# Patient Record
Sex: FEMALE | Race: WHITE | Employment: OTHER | URBAN - METROPOLITAN AREA
[De-identification: names, ages, dates, MRNs, and addresses within clinical notes are randomized per-mention and may not be internally consistent; named-entity substitution may affect disease eponyms.]

---

## 2017-01-14 ENCOUNTER — HOSPITAL ENCOUNTER (EMERGENCY)
Age: 32
Discharge: HOME | End: 2017-01-14
Attending: EMERGENCY MEDICINE
Payer: MEDICAID

## 2017-01-14 VITALS
OXYGEN SATURATION: 97 % | BODY MASS INDEX: 25.71 KG/M2 | SYSTOLIC BLOOD PRESSURE: 115 MMHG | DIASTOLIC BLOOD PRESSURE: 85 MMHG | TEMPERATURE: 98.7 F | HEIGHT: 66 IN | HEART RATE: 93 BPM | RESPIRATION RATE: 22 BRPM | WEIGHT: 160 LBS
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
## 2017-01-14 PROCEDURE — 75810000275 HC EMERGENCY DEPT VISIT NO LEVEL OF CARE

## 2017-01-14 PROCEDURE — 99284 EMERGENCY DEPT VISIT MOD MDM: CPT

## 2017-01-14 RX ORDER — AZITHROMYCIN 250 MG/1
1000 TABLET, FILM COATED ORAL ONCE
Status: DISCONTINUED | OUTPATIENT
Start: 2017-01-14 | End: 2017-01-14

## 2017-01-14 NOTE — ED NOTES
Pt refusing forensics exam, and has been given education and resources by Angelica Thompson and Discharge instructions given to patient by MD and nurse. Pt has been given counseling on medication use and verbalizes understanding. . Pt ambulated off of unit in no signs of distress.

## 2017-01-14 NOTE — DISCHARGE INSTRUCTIONS
We hope that we have addressed all of your medical concerns. The examination and treatment you received in the Emergency Department were for an emergent problem and were not intended as complete care. It is important that you follow up with your healthcare provider(s) for ongoing care. If your symptoms worsen or do not improve as expected, and you are unable to reach your usual health care provider(s), you should return to the Emergency Department. Today's healthcare is undergoing tremendous change, and patient satisfaction surveys are one of the many tools to assess the quality of medical care. You may receive a survey from the [employer] regarding your experience in the Emergency Department. I hope that your experience has been completely positive, particularly the medical care that I provided. As such, please participate in the survey; anything less than excellent does not meet my expectations or intentions. [facility] and [address] [place] [address] participate in nationally recognized quality of care measures. If your blood pressure is greater than 120/80, as reported below, we urge that you seek medical care to address the potential of high blood pressure, commonly known as hypertension. Hypertension can be hereditary or can be caused by certain medical conditions, pain, stress, or \"white coat syndrome. \"       Please make an appointment with your health care provider(s) for follow up of your Emergency Department visit. VITALS:   Patient Vitals for the past 8 hrs:   Temp Pulse Resp BP SpO2   01/14/17 1442 98.7 °F (37.1 °C) 93 22 115/85 97 %          Thank you for allowing us to provide you with medical care today. We realize that you have many choices for your emergency care needs. Please choose us in the future for any continued health care needs. Nuris Mccord M.D.  [facility]: [phone]            No results found for this or any previous visit (from the past 24 hour(s)). No results found.
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
## 2017-06-08 ENCOUNTER — HOSPITAL ENCOUNTER (INPATIENT)
Age: 32
LOS: 2 days | Discharge: HOME | DRG: 282 | End: 2017-06-10
Attending: EMERGENCY MEDICINE | Admitting: INTERNAL MEDICINE
Payer: MEDICAID

## 2017-06-08 ENCOUNTER — APPOINTMENT (OUTPATIENT)
Dept: CT IMAGING | Age: 32
DRG: 282 | End: 2017-06-08
Attending: PHYSICIAN ASSISTANT
Payer: MEDICAID

## 2017-06-08 ENCOUNTER — APPOINTMENT (OUTPATIENT)
Dept: ULTRASOUND IMAGING | Age: 32
DRG: 282 | End: 2017-06-08
Attending: INTERNAL MEDICINE
Payer: MEDICAID

## 2017-06-08 DIAGNOSIS — N30.00 ACUTE CYSTITIS WITHOUT HEMATURIA: ICD-10-CM

## 2017-06-08 DIAGNOSIS — K85.90 ACUTE PANCREATITIS WITHOUT INFECTION OR NECROSIS, UNSPECIFIED PANCREATITIS TYPE: Primary | ICD-10-CM

## 2017-06-08 PROBLEM — F10.10 ALCOHOL ABUSE: Status: ACTIVE | Noted: 2017-06-08

## 2017-06-08 PROBLEM — Z72.0 TOBACCO ABUSE: Status: ACTIVE | Noted: 2017-06-08

## 2017-06-08 LAB
ALBUMIN SERPL BCP-MCNC: 3.7 G/DL (ref 3.5–5)
ALBUMIN/GLOB SERPL: 0.8 {RATIO} (ref 1.1–2.2)
ALP SERPL-CCNC: 65 U/L (ref 45–117)
ALT SERPL-CCNC: 28 U/L (ref 12–78)
ANION GAP BLD CALC-SCNC: 9 MMOL/L (ref 5–15)
APPEARANCE UR: ABNORMAL
AST SERPL W P-5'-P-CCNC: 27 U/L (ref 15–37)
BACTERIA URNS QL MICRO: ABNORMAL /HPF
BASOPHILS # BLD AUTO: 0 K/UL (ref 0–0.1)
BASOPHILS # BLD: 0 % (ref 0–1)
BILIRUB SERPL-MCNC: 0.2 MG/DL (ref 0.2–1)
BILIRUB UR QL: NEGATIVE
BUN SERPL-MCNC: 10 MG/DL (ref 6–20)
BUN/CREAT SERPL: 13 (ref 12–20)
CALCIUM SERPL-MCNC: 9.2 MG/DL (ref 8.5–10.1)
CHLORIDE SERPL-SCNC: 102 MMOL/L (ref 97–108)
CO2 SERPL-SCNC: 27 MMOL/L (ref 21–32)
COLOR UR: ABNORMAL
CREAT SERPL-MCNC: 0.76 MG/DL (ref 0.55–1.02)
EOSINOPHIL # BLD: 0.1 K/UL (ref 0–0.4)
EOSINOPHIL NFR BLD: 1 % (ref 0–7)
EPITH CASTS URNS QL MICRO: ABNORMAL /LPF
ERYTHROCYTE [DISTWIDTH] IN BLOOD BY AUTOMATED COUNT: 14.2 % (ref 11.5–14.5)
GLOBULIN SER CALC-MCNC: 4.7 G/DL (ref 2–4)
GLUCOSE SERPL-MCNC: 72 MG/DL (ref 65–100)
GLUCOSE UR STRIP.AUTO-MCNC: 100 MG/DL
HCG UR QL: NEGATIVE
HCT VFR BLD AUTO: 42.1 % (ref 35–47)
HGB BLD-MCNC: 13.8 G/DL (ref 11.5–16)
HGB UR QL STRIP: NEGATIVE
KETONES UR QL STRIP.AUTO: NEGATIVE MG/DL
LEUKOCYTE ESTERASE UR QL STRIP.AUTO: ABNORMAL
LIPASE SERPL-CCNC: 1067 U/L (ref 73–393)
LYMPHOCYTES # BLD AUTO: 15 % (ref 12–49)
LYMPHOCYTES # BLD: 1.5 K/UL (ref 0.8–3.5)
MCH RBC QN AUTO: 32.4 PG (ref 26–34)
MCHC RBC AUTO-ENTMCNC: 32.8 G/DL (ref 30–36.5)
MCV RBC AUTO: 98.8 FL (ref 80–99)
MONOCYTES # BLD: 1.2 K/UL (ref 0–1)
MONOCYTES NFR BLD AUTO: 12 % (ref 5–13)
NEUTS SEG # BLD: 6.8 K/UL (ref 1.8–8)
NEUTS SEG NFR BLD AUTO: 72 % (ref 32–75)
NITRITE UR QL STRIP.AUTO: NEGATIVE
PH UR STRIP: 6 [PH] (ref 5–8)
PLATELET # BLD AUTO: 180 K/UL (ref 150–400)
POTASSIUM SERPL-SCNC: 4.4 MMOL/L (ref 3.5–5.1)
PROT SERPL-MCNC: 8.4 G/DL (ref 6.4–8.2)
PROT UR STRIP-MCNC: NEGATIVE MG/DL
RBC # BLD AUTO: 4.26 M/UL (ref 3.8–5.2)
RBC #/AREA URNS HPF: ABNORMAL /HPF (ref 0–5)
SODIUM SERPL-SCNC: 138 MMOL/L (ref 136–145)
SP GR UR REFRACTOMETRY: 1.01 (ref 1–1.03)
UROBILINOGEN UR QL STRIP.AUTO: 0.2 EU/DL (ref 0.2–1)
WBC # BLD AUTO: 9.5 K/UL (ref 3.6–11)
WBC URNS QL MICRO: ABNORMAL /HPF (ref 0–4)

## 2017-06-08 PROCEDURE — 81001 URINALYSIS AUTO W/SCOPE: CPT | Performed by: EMERGENCY MEDICINE

## 2017-06-08 PROCEDURE — 74176 CT ABD & PELVIS W/O CONTRAST: CPT

## 2017-06-08 PROCEDURE — 76705 ECHO EXAM OF ABDOMEN: CPT

## 2017-06-08 PROCEDURE — 96374 THER/PROPH/DIAG INJ IV PUSH: CPT

## 2017-06-08 PROCEDURE — 83690 ASSAY OF LIPASE: CPT | Performed by: PHYSICIAN ASSISTANT

## 2017-06-08 PROCEDURE — 74011250636 HC RX REV CODE- 250/636: Performed by: PHYSICIAN ASSISTANT

## 2017-06-08 PROCEDURE — 80053 COMPREHEN METABOLIC PANEL: CPT | Performed by: PHYSICIAN ASSISTANT

## 2017-06-08 PROCEDURE — 74011250637 HC RX REV CODE- 250/637: Performed by: INTERNAL MEDICINE

## 2017-06-08 PROCEDURE — 81025 URINE PREGNANCY TEST: CPT

## 2017-06-08 PROCEDURE — 74011000258 HC RX REV CODE- 258: Performed by: PHYSICIAN ASSISTANT

## 2017-06-08 PROCEDURE — 65270000029 HC RM PRIVATE

## 2017-06-08 PROCEDURE — 74011000250 HC RX REV CODE- 250: Performed by: INTERNAL MEDICINE

## 2017-06-08 PROCEDURE — 96361 HYDRATE IV INFUSION ADD-ON: CPT

## 2017-06-08 PROCEDURE — 36415 COLL VENOUS BLD VENIPUNCTURE: CPT | Performed by: PHYSICIAN ASSISTANT

## 2017-06-08 PROCEDURE — 99285 EMERGENCY DEPT VISIT HI MDM: CPT

## 2017-06-08 PROCEDURE — 85025 COMPLETE CBC W/AUTO DIFF WBC: CPT | Performed by: PHYSICIAN ASSISTANT

## 2017-06-08 PROCEDURE — 74011000250 HC RX REV CODE- 250: Performed by: PHYSICIAN ASSISTANT

## 2017-06-08 PROCEDURE — 96375 TX/PRO/DX INJ NEW DRUG ADDON: CPT

## 2017-06-08 PROCEDURE — 74011250636 HC RX REV CODE- 250/636: Performed by: INTERNAL MEDICINE

## 2017-06-08 RX ORDER — MORPHINE SULFATE 4 MG/ML
4 INJECTION, SOLUTION INTRAMUSCULAR; INTRAVENOUS
Status: COMPLETED | OUTPATIENT
Start: 2017-06-08 | End: 2017-06-08

## 2017-06-08 RX ORDER — IBUPROFEN 200 MG
1 TABLET ORAL DAILY
Status: DISCONTINUED | OUTPATIENT
Start: 2017-06-08 | End: 2017-06-10 | Stop reason: HOSPADM

## 2017-06-08 RX ORDER — MORPHINE SULFATE 2 MG/ML
2 INJECTION, SOLUTION INTRAMUSCULAR; INTRAVENOUS
Status: DISCONTINUED | OUTPATIENT
Start: 2017-06-08 | End: 2017-06-08

## 2017-06-08 RX ORDER — HYDROMORPHONE HYDROCHLORIDE 1 MG/ML
0.5 INJECTION, SOLUTION INTRAMUSCULAR; INTRAVENOUS; SUBCUTANEOUS
Status: DISCONTINUED | OUTPATIENT
Start: 2017-06-08 | End: 2017-06-08

## 2017-06-08 RX ORDER — ONDANSETRON 2 MG/ML
4 INJECTION INTRAMUSCULAR; INTRAVENOUS
Status: COMPLETED | OUTPATIENT
Start: 2017-06-08 | End: 2017-06-08

## 2017-06-08 RX ORDER — DIPHENHYDRAMINE HYDROCHLORIDE 50 MG/ML
25 INJECTION, SOLUTION INTRAMUSCULAR; INTRAVENOUS
Status: DISCONTINUED | OUTPATIENT
Start: 2017-06-08 | End: 2017-06-10 | Stop reason: HOSPADM

## 2017-06-08 RX ORDER — QUETIAPINE FUMARATE 25 MG/1
50 TABLET, FILM COATED ORAL 3 TIMES DAILY
Status: DISCONTINUED | OUTPATIENT
Start: 2017-06-08 | End: 2017-06-10 | Stop reason: HOSPADM

## 2017-06-08 RX ORDER — HYDROMORPHONE HYDROCHLORIDE 1 MG/ML
0.5 INJECTION, SOLUTION INTRAMUSCULAR; INTRAVENOUS; SUBCUTANEOUS
Status: DISCONTINUED | OUTPATIENT
Start: 2017-06-08 | End: 2017-06-09

## 2017-06-08 RX ORDER — SODIUM CHLORIDE 0.9 % (FLUSH) 0.9 %
5-10 SYRINGE (ML) INJECTION AS NEEDED
Status: DISCONTINUED | OUTPATIENT
Start: 2017-06-08 | End: 2017-06-10 | Stop reason: HOSPADM

## 2017-06-08 RX ORDER — QUETIAPINE FUMARATE 50 MG/1
50 TABLET, FILM COATED ORAL 3 TIMES DAILY
Status: ON HOLD | COMMUNITY
End: 2017-06-10

## 2017-06-08 RX ORDER — ENOXAPARIN SODIUM 100 MG/ML
40 INJECTION SUBCUTANEOUS EVERY 24 HOURS
Status: DISCONTINUED | OUTPATIENT
Start: 2017-06-08 | End: 2017-06-10 | Stop reason: HOSPADM

## 2017-06-08 RX ORDER — TRAZODONE HYDROCHLORIDE 150 MG/1
150 TABLET ORAL
Status: ON HOLD | COMMUNITY
End: 2017-08-04

## 2017-06-08 RX ORDER — BISMUTH SUBSALICYLATE 262 MG
1 TABLET,CHEWABLE ORAL DAILY
Status: ON HOLD | COMMUNITY
End: 2017-08-04

## 2017-06-08 RX ORDER — SODIUM CHLORIDE 9 MG/ML
150 INJECTION, SOLUTION INTRAVENOUS CONTINUOUS
Status: DISCONTINUED | OUTPATIENT
Start: 2017-06-08 | End: 2017-06-10

## 2017-06-08 RX ORDER — QUETIAPINE FUMARATE 50 MG/1
50 TABLET, FILM COATED ORAL AS NEEDED
COMMUNITY
End: 2017-06-10

## 2017-06-08 RX ORDER — ALBUTEROL SULFATE 90 UG/1
2 AEROSOL, METERED RESPIRATORY (INHALATION)
COMMUNITY

## 2017-06-08 RX ORDER — ONDANSETRON 2 MG/ML
4 INJECTION INTRAMUSCULAR; INTRAVENOUS
Status: DISCONTINUED | OUTPATIENT
Start: 2017-06-08 | End: 2017-06-10 | Stop reason: HOSPADM

## 2017-06-08 RX ORDER — FAMOTIDINE 10 MG/ML
20 INJECTION INTRAVENOUS
Status: COMPLETED | OUTPATIENT
Start: 2017-06-08 | End: 2017-06-08

## 2017-06-08 RX ORDER — SODIUM CHLORIDE 0.9 % (FLUSH) 0.9 %
5-10 SYRINGE (ML) INJECTION EVERY 8 HOURS
Status: DISCONTINUED | OUTPATIENT
Start: 2017-06-08 | End: 2017-06-10 | Stop reason: HOSPADM

## 2017-06-08 RX ADMIN — QUETIAPINE FUMARATE 50 MG: 25 TABLET ORAL at 22:09

## 2017-06-08 RX ADMIN — DIPHENHYDRAMINE HYDROCHLORIDE 25 MG: 50 INJECTION, SOLUTION INTRAMUSCULAR; INTRAVENOUS at 19:18

## 2017-06-08 RX ADMIN — ONDANSETRON 4 MG: 2 INJECTION INTRAMUSCULAR; INTRAVENOUS at 17:28

## 2017-06-08 RX ADMIN — ONDANSETRON 4 MG: 2 INJECTION INTRAMUSCULAR; INTRAVENOUS at 18:53

## 2017-06-08 RX ADMIN — CEFTRIAXONE SODIUM 1 G: 1 INJECTION, POWDER, FOR SOLUTION INTRAMUSCULAR; INTRAVENOUS at 18:56

## 2017-06-08 RX ADMIN — SODIUM CHLORIDE 1000 ML: 900 INJECTION, SOLUTION INTRAVENOUS at 17:28

## 2017-06-08 RX ADMIN — FOLIC ACID: 5 INJECTION, SOLUTION INTRAMUSCULAR; INTRAVENOUS; SUBCUTANEOUS at 20:17

## 2017-06-08 RX ADMIN — SODIUM CHLORIDE 150 ML/HR: 900 INJECTION, SOLUTION INTRAVENOUS at 20:17

## 2017-06-08 RX ADMIN — Medication 4 MG: at 17:39

## 2017-06-08 RX ADMIN — Medication 4 MG: at 18:54

## 2017-06-08 RX ADMIN — TRAZODONE HYDROCHLORIDE 150 MG: 100 TABLET ORAL at 22:09

## 2017-06-08 RX ADMIN — HYDROMORPHONE HYDROCHLORIDE 0.5 MG: 1 INJECTION, SOLUTION INTRAMUSCULAR; INTRAVENOUS; SUBCUTANEOUS at 20:19

## 2017-06-08 RX ADMIN — FAMOTIDINE 20 MG: 10 INJECTION, SOLUTION INTRAVENOUS at 17:38
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
## 2017-06-08 NOTE — ROUTINE PROCESS
TRANSFER - OUT REPORT:    Verbal report given to Vivi Hwang RN(name) on Gerry Mcdaniels  being transferred to 5th floor(unit) for routine progression of care       Report consisted of patients Situation, Background, Assessment and   Recommendations(SBAR). Information from the following report(s) SBAR, ED Summary, [facility] and Recent Results was reviewed with the receiving nurse. Lines:   Peripheral IV 06/08/17 Left Forearm (Active)   Site Assessment Clean, dry, & intact 6/8/2017  5:40 PM   Phlebitis Assessment 0 6/8/2017  5:40 PM   Infiltration Assessment 0 6/8/2017  5:40 PM   Dressing Status Clean, dry, & intact 6/8/2017  5:40 PM   Hub Color/Line Status Blue 6/8/2017  5:40 PM        Opportunity for questions and clarification was provided.       Patient transported with:   [employer]iiCard

## 2017-06-08 NOTE — ED NOTES
Patient updated on status. Patient reports she \"signed out\" of [facility] 3 weeks ago for similar pain. Patient states \"I was told it was not pancreatitis, and they asked if I was pregnant 3 days after I had been there, so I left\". Patient reports history of pancreatitis. States she has not had a drink in almost 4 weeks, reports \"relapsing\" 4 weeks ago.

## 2017-06-08 NOTE — IP AVS SNAPSHOT
Tamie Hodgkin 
 
 
 [address] [facility] [address] [place] [address] 
[phone] Patient: Rebecca Purvis MRN: [id number] :1985 You are allergic to the following Allergen Reactions Latex Swelling Bactrim (Sulfamethoprim Ds) Swelling Iodine Swelling Tree Nuts Anaphylaxis Morphine Hives Sulfa (Sulfonamide Antibiotics) Swelling Recent Documentation Height Weight Breastfeeding? BMI OB Status Smoking Status 1.676 m 61.2 kg No 21.79 kg/m2 Unknown Current Every Day Smoker Emergency Contacts Name Discharge Info Relation Home Work Mobile Enrique Fowler CAREGIVER [3] Mother [14] [phone] About your hospitalization You were admitted on:  2017 You last received care in the:  [facility]  MED SURG 2 You were discharged on:  Beverly 10, 2017 Unit phone number:  [phone] Why you were hospitalized Your primary diagnosis was:  Acute Pancreatitis Your diagnoses also included:  Alcohol Abuse, Tobacco Abuse, Acute Cystitis Providers Seen During Your Hospitalizations Provider Role Specialty Primary office phone Carlos Arango MD Attending Provider Emergency Medicine [phone] Anali La MD Attending Provider Internal Medicine [phone] Ghanshyam Gonzalez DO Attending Provider Internal Medicine [phone] Your Primary Care Physician (PCP) Primary Care Physician Office Phone Office Fax Carisa Low [phone] Follow-up Information Follow up With Details Comments Contact Info Grant Hill MD In 2 weeks  [address] [facility] [address] [facility] [address] 
[phone] Florencia Calvert MD Call in 3 days  [address] [address] 66 [phone] [address] [place] [address] 
[phone] Current Discharge Medication List  
  
START taking these medications  Dose & Instructions Dispensing Information Comments Morning Noon Evening Bedtime  
 folic acid 1 mg tablet Commonly known as:  Google Your last dose was: Your next dose is:    
   
   
 Dose:  1 mg Take 1 Tab by mouth daily. Quantity:  30 Tab Refills:  0  
     
   
   
   
  
 levoFLOXacin 750 mg tablet Commonly known as:  Garrett Buenrostro Your last dose was: Your next dose is:    
   
   
 Dose:  750 mg Take 1 Tab by mouth every twenty-four (24) hours. Quantity:  4 Tab Refills:  0  
     
   
   
   
  
 ondansetron 4 mg disintegrating tablet Commonly known as:  ZOFRAN ODT Your last dose was: Your next dose is:    
   
   
 Dose:  4 mg Take 1 Tab by mouth every eight (8) hours as needed for Nausea. Quantity:  20 Tab Refills:  0  
     
   
   
   
  
 thiamine 100 mg tablet Commonly known as:  B-1 Your last dose was: Your next dose is:    
   
   
 Dose:  100 mg Take 1 Tab by mouth daily. Quantity:  30 Tab Refills:  0  
     
   
   
   
  
 traMADol 50 mg tablet Commonly known as:  ULTRAM  
   
Your last dose was: Your next dose is:    
   
   
 Dose:  50 mg Take 1 Tab by mouth every six (6) hours as needed for Pain. Max Daily Amount: 200 mg. Quantity:  20 Tab Refills:  0 CONTINUE these medications which have CHANGED Dose & Instructions Dispensing Information Comments Morning Noon Evening Bedtime QUEtiapine 50 mg tablet Commonly known as:  SEROquel What changed:  Another medication with the same name was removed. Continue taking this medication, and follow the directions you see here. Your last dose was: Your next dose is:    
   
   
 Dose:  50 mg Take 1 Tab by mouth three (3) times daily. Quantity:  30 Tab Refills:  0 CONTINUE these medications which have NOT CHANGED Dose & Instructions Dispensing Information Comments Morning Noon Evening Bedtime multivitamin tablet Commonly known as:  ONE A DAY Your last dose was: Your next dose is:    
   
   
 Dose:  1 Tab Take 1 Tab by mouth daily. Refills:  0  
     
   
   
   
  
 traZODone 150 mg tablet Commonly known as:  [place] Your last dose was: Your next dose is:    
   
   
 Dose:  150 mg Take 150 mg by mouth nightly. Refills:  0 VENTOLIN HFA 90 mcg/actuation inhaler Generic drug:  albuterol Your last dose was: Your next dose is:    
   
   
 Dose:  2 Puff Take 2 Puffs by inhalation every four (4) hours as needed for Wheezing. Refills:  0 Where to Get Your Medications Information on where to get these meds will be given to you by the nurse or doctor. ! Ask your nurse or doctor about these medications  
  folic acid 1 mg tablet  
 levoFLOXacin 750 mg tablet  
 ondansetron 4 mg disintegrating tablet QUEtiapine 50 mg tablet  
 thiamine 100 mg tablet  
 traMADol 50 mg tablet Discharge Instructions Patient Discharge Instructions Court Aliyah / [id number] : 1985 Admitted 2017 Discharged: 6/10/2017 Primary Diagnoses Problem List as of 6/10/2017  Date Reviewed: 2017 Codes Class Noted - Resolved * (Principal)Acute pancreatitis ICD-10-CM: K85.90 ICD-9-CM: 529.1  2017 - Present Alcohol abuse ICD-10-CM: F10.10 ICD-9-CM: 305.00  2017 - Present Tobacco abuse ICD-10-CM: Z72.0 ICD-9-CM: 305.1  2017 - Present Acute cystitis ICD-10-CM: N30.00 ICD-9-CM: 595.0  2017 - Present Drug-induced mood disorder (HCC) ICD-10-CM: L83.05 ICD-9-CM: 292.84  7/15/2013 - Present Combinations of drug dependence excluding opioid type drug, unspecified ICD-10-CM: F19.20 ICD-9-CM: 304.80  7/15/2013 - Present Overview Signed 7/15/2013 10:58 AM by Jose J Tran MD  
  THC, ETOH, Cocaine Take Home Medications · It is important that you take the medication exactly as they are prescribed. · Keep your medication in the bottles provided by the pharmacist and keep a list of the medication names, dosages, and times to be taken in your wallet. · Do not take other medications without consulting your doctor. What to do at [facility] Recommended diet: Low fat, Low cholesterol Recommended activity: Activity as tolerated If you experience worsening abdominal pain, inability to tolerate PO, please follow up with nearest ER. Follow-up with your PCP in 2 week Information obtained by : 
I understand that if any problems occur once I am at home I am to contact my physician. I understand and acknowledge receipt of the instructions indicated above. Physician's or R.N.'s Signature                                                                  Date/Time Patient or Representative Signature                                                          Date/Time Learning About Alcohol Misuse What is alcohol misuse? Alcohol misuse means drinking so much that it causes problems for you or others. Early problems with alcohol can start at home. You may argue with loved ones about how much you're drinking. Your job may be affected because of drinking. You may drink when it's dangerous or illegal, such as when you drive. Drinking too much for a long time can lead to health conditions like high blood pressure and liver problems. What are the symptoms? Symptoms of alcohol misuse may include: · Drinking much more than you planned. · Drinking even though it's causing problems for you or others.  
· Putting yourself in situations where you might get hurt. · Wanting to cut down or stop drinking, but not being able to. · Feeling guilty about how much you're drinking. How is alcohol misuse treated? Getting help for problems with alcohol is up to you. But you don't have to do it alone. There are many people and kinds of treatments to help with alcohol problems. Talking to your doctor is the first step. When you get a doctor's help, treatment for alcohol problems can be safer and quicker. Treatment options can include: · Treatment programs. Examples are group therapy, one or more types of counseling, and alcohol education. · Medicines. A doctor or counselor can help you know what kinds of medicines might help with cravings. · Free social support groups. These groups include AA (Alcoholics Anonymous) and SMART (Self-Management and Recovery Training). Your doctor can help you decide which type of program is best for you. Follow-up care is a key part of your treatment and safety. Be sure to make and go to all appointments, and call your doctor if you are having problems. It's also a good idea to know your test results and keep a list of the medicines you take. Where can you learn more? Go to http://dieudonneZend Technologiesannette.info/. Enter [id number] in the search box to learn more about \"Learning About Alcohol Misuse. \" Current as of: January 3, 2017 Content Version: 11.2 © 6036-3996 [employer]. Care instructions adapted under license by [employer] (which disclaims liability or warranty for this information). If you have questions about a medical condition or this instruction, always ask your healthcare professional. [address] [address] any warranty or liability for your use of this information. Acute Alcohol Intoxication: Care Instructions Your Care Instructions You have had treatment to help your body rid itself of alcohol.  Too much alcohol upsets the body's fluid balance. Your doctor may have given you fluids and vitamins. For some people, drinking too much alcohol is a one-time event. For others, it is an ongoing problem. In either case, it is serious. It can be life-threatening. Follow-up care is a key part of your treatment and safety. Be sure to make and go to all appointments, and call your doctor if you are having problems. It's also a good idea to know your test results and keep a list of the medicines you take. How can you care for yourself at home? · Be safe with medicines. Take your medicines exactly as prescribed. Call your doctor if you think you are having a problem with your medicine. · Your doctor may have prescribed disulfiram (Antabuse). Do not drink any alcohol while you are taking this medicine. You may have severe or even life-threatening side effects from even small amounts of alcohol. · If you were given medicine to prevent nausea, be sure to take it exactly as prescribed. · Before you take any medicine, tell your doctor if: 
¨ You have had a bad reaction to any medicines in the past. 
¨ You are taking other medicines, including over-the-counter ones, or have other health problems. ¨ You are or could be pregnant. · Be prepared to have some symptoms of withdrawal in the next few days. · Drink plenty of liquids in the next few days. · Seek help if you need it to stop drinking. Getting counseling and joining a support group can help you stay sober. Try a support group such as Alcoholics Anonymous. · Avoid alcohol when you take medicines. It can react with many medicines and cause serious problems. When should you call for help? Call 911 anytime you think you may need emergency care. For example, call if: 
· You feel confused and are seeing things that are not there. · You are thinking about killing yourself or hurting others. · You have a seizure. · You vomit blood or what looks like coffee grounds.  
Call your doctor now or seek immediate medical care if: 
· You have trembling, restlessness, sweating, and other withdrawal symptoms that are new or that get worse. · Your withdrawal symptoms come back after not bothering you for days or weeks. · You can't stop vomiting. Watch closely for changes in your health, and be sure to contact your doctor if: 
· You need help to stop drinking. Where can you learn more? Go to http://dieudonne-annette.info/. Enter T102 in the search box to learn more about \"Acute Alcohol Intoxication: Care Instructions. \" Current as of: November 3, 2016 Content Version: 11.2 © 5500-3036 [employer]. Care instructions adapted under license by [employer] (which disclaims liability or warranty for this information). If you have questions about a medical condition or this instruction, always ask your healthcare professional. [address] any warranty or liability for your use of this information. Discharge Orders None Spireon Announcement We are excited to announce that we are making your provider's discharge notes available to you in Spireon. You will see these notes when they are completed and signed by the physician that discharged you from your recent hospital stay. If you have any questions or concerns about any information you see in Spireon, please call the Health Information Department where you were seen or reach out to your Primary Care Provider for more information about your plan of care. Introducing [facility]! Dear Jeri Gonzalez: Thank you for requesting a Spireon account. Our records indicate that you already have an active Spireon account. You can access your account anytime at [email or web address] Did you know that you can access your hospital and ER discharge instructions at any time in Spireon? You can also review all of your test results from your hospital stay or ER visit.  
 
Additional Information If you have questions, please visit the Frequently Asked Questions section of the [employer] website at [email or web address] Remember, MyChart is NOT to be used for urgent needs. For medical emergencies, dial 911. Now available from your iPhone and Android! General Information Please provide this summary of care documentation to your next provider. Patient Signature:  ____________________________________________________________ Date:  ____________________________________________________________  
  
[facility] Provider Signature:  ____________________________________________________________ Date:  ____________________________________________________________

## 2017-06-08 NOTE — ED TRIAGE NOTES
Patient brought in by EMS for right-sided, upper abdominal pain for 4 days, increasing pain today. Patient unsure of pregnancy status.   No diarrhea, no fever. + N/V.

## 2017-06-08 NOTE — ED NOTES
After pushing 2nd dose of IV morphine, patient developed itching at IV site and minimal hives. Reaction noted just to left arm around IV site. Patient denied any other itching or trouble breathing. Will continue to monitor.
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
## 2017-06-08 NOTE — IP AVS SNAPSHOT
Current Discharge Medication List  
  
START taking these medications Dose & Instructions Dispensing Information Comments Morning Noon Evening Bedtime  
 folic acid 1 mg tablet Commonly known as:  Google Your last dose was: Your next dose is:    
   
   
 Dose:  1 mg Take 1 Tab by mouth daily. Quantity:  30 Tab Refills:  0  
     
   
   
   
  
 levoFLOXacin 750 mg tablet Commonly known as:  Sachin Curran Your last dose was: Your next dose is:    
   
   
 Dose:  750 mg Take 1 Tab by mouth every twenty-four (24) hours. Quantity:  4 Tab Refills:  0  
     
   
   
   
  
 ondansetron 4 mg disintegrating tablet Commonly known as:  ZOFRAN ODT Your last dose was: Your next dose is:    
   
   
 Dose:  4 mg Take 1 Tab by mouth every eight (8) hours as needed for Nausea. Quantity:  20 Tab Refills:  0  
     
   
   
   
  
 thiamine 100 mg tablet Commonly known as:  B-1 Your last dose was: Your next dose is:    
   
   
 Dose:  100 mg Take 1 Tab by mouth daily. Quantity:  30 Tab Refills:  0  
     
   
   
   
  
 traMADol 50 mg tablet Commonly known as:  ULTRAM  
   
Your last dose was: Your next dose is:    
   
   
 Dose:  50 mg Take 1 Tab by mouth every six (6) hours as needed for Pain. Max Daily Amount: 200 mg. Quantity:  20 Tab Refills:  0 CONTINUE these medications which have CHANGED Dose & Instructions Dispensing Information Comments Morning Noon Evening Bedtime QUEtiapine 50 mg tablet Commonly known as:  SEROquel What changed:  Another medication with the same name was removed. Continue taking this medication, and follow the directions you see here. Your last dose was: Your next dose is:    
   
   
 Dose:  50 mg Take 1 Tab by mouth three (3) times daily. Quantity:  30 Tab Refills:  0 CONTINUE these medications which have NOT CHANGED Dose & Instructions Dispensing Information Comments Morning Noon Evening Bedtime  
 multivitamin tablet Commonly known as:  ONE A DAY Your last dose was: Your next dose is:    
   
   
 Dose:  1 Tab Take 1 Tab by mouth daily. Refills:  0  
     
   
   
   
  
 traZODone 150 mg tablet Commonly known as:  [place]ermeo Horse Your last dose was: Your next dose is:    
   
   
 Dose:  150 mg Take 150 mg by mouth nightly. Refills:  0 VENTOLIN HFA 90 mcg/actuation inhaler Generic drug:  albuterol Your last dose was: Your next dose is:    
   
   
 Dose:  2 Puff Take 2 Puffs by inhalation every four (4) hours as needed for Wheezing. Refills:  0 Where to Get Your Medications Information on where to get these meds will be given to you by the nurse or doctor. ! Ask your nurse or doctor about these medications  
  folic acid 1 mg tablet  
 levoFLOXacin 750 mg tablet  
 ondansetron 4 mg disintegrating tablet QUEtiapine 50 mg tablet  
 thiamine 100 mg tablet  
 traMADol 50 mg tablet

## 2017-06-08 NOTE — ED NOTES
Hives progressing on left arm, patient still denies SOB, spoke with Dr. Patricia Whaley for IV Benadryl.

## 2017-06-09 LAB
ALBUMIN SERPL BCP-MCNC: 2.7 G/DL (ref 3.5–5)
ALBUMIN/GLOB SERPL: 0.8 {RATIO} (ref 1.1–2.2)
ALP SERPL-CCNC: 42 U/L (ref 45–117)
ALT SERPL-CCNC: 22 U/L (ref 12–78)
ANION GAP BLD CALC-SCNC: 8 MMOL/L (ref 5–15)
AST SERPL W P-5'-P-CCNC: 22 U/L (ref 15–37)
BILIRUB SERPL-MCNC: 0.3 MG/DL (ref 0.2–1)
BUN SERPL-MCNC: 5 MG/DL (ref 6–20)
BUN/CREAT SERPL: 8 (ref 12–20)
CALCIUM SERPL-MCNC: 8.2 MG/DL (ref 8.5–10.1)
CHLORIDE SERPL-SCNC: 107 MMOL/L (ref 97–108)
CO2 SERPL-SCNC: 25 MMOL/L (ref 21–32)
CREAT SERPL-MCNC: 0.6 MG/DL (ref 0.55–1.02)
GLOBULIN SER CALC-MCNC: 3.5 G/DL (ref 2–4)
GLUCOSE SERPL-MCNC: 80 MG/DL (ref 65–100)
LIPASE SERPL-CCNC: 191 U/L (ref 73–393)
POTASSIUM SERPL-SCNC: 3.8 MMOL/L (ref 3.5–5.1)
PROT SERPL-MCNC: 6.2 G/DL (ref 6.4–8.2)
SODIUM SERPL-SCNC: 140 MMOL/L (ref 136–145)
TRIGL SERPL-MCNC: 93 MG/DL (ref ?–150)

## 2017-06-09 PROCEDURE — 74011000258 HC RX REV CODE- 258: Performed by: INTERNAL MEDICINE

## 2017-06-09 PROCEDURE — 36415 COLL VENOUS BLD VENIPUNCTURE: CPT | Performed by: INTERNAL MEDICINE

## 2017-06-09 PROCEDURE — 83690 ASSAY OF LIPASE: CPT | Performed by: INTERNAL MEDICINE

## 2017-06-09 PROCEDURE — 74011250637 HC RX REV CODE- 250/637: Performed by: INTERNAL MEDICINE

## 2017-06-09 PROCEDURE — 80053 COMPREHEN METABOLIC PANEL: CPT | Performed by: INTERNAL MEDICINE

## 2017-06-09 PROCEDURE — 84478 ASSAY OF TRIGLYCERIDES: CPT | Performed by: INTERNAL MEDICINE

## 2017-06-09 PROCEDURE — 65270000029 HC RM PRIVATE

## 2017-06-09 PROCEDURE — 74011250636 HC RX REV CODE- 250/636: Performed by: INTERNAL MEDICINE

## 2017-06-09 RX ORDER — FOLIC ACID 1 MG/1
1 TABLET ORAL DAILY
Status: DISCONTINUED | OUTPATIENT
Start: 2017-06-09 | End: 2017-06-10 | Stop reason: HOSPADM

## 2017-06-09 RX ORDER — THERA TABS 400 MCG
1 TAB ORAL DAILY
Status: DISCONTINUED | OUTPATIENT
Start: 2017-06-09 | End: 2017-06-10 | Stop reason: HOSPADM

## 2017-06-09 RX ORDER — LANOLIN ALCOHOL/MO/W.PET/CERES
100 CREAM (GRAM) TOPICAL DAILY
Status: DISCONTINUED | OUTPATIENT
Start: 2017-06-09 | End: 2017-06-10 | Stop reason: HOSPADM

## 2017-06-09 RX ORDER — HYDROMORPHONE HYDROCHLORIDE 2 MG/1
4 TABLET ORAL
Status: DISCONTINUED | OUTPATIENT
Start: 2017-06-09 | End: 2017-06-10 | Stop reason: HOSPADM

## 2017-06-09 RX ADMIN — HYDROMORPHONE HYDROCHLORIDE 0.5 MG: 1 INJECTION, SOLUTION INTRAMUSCULAR; INTRAVENOUS; SUBCUTANEOUS at 04:40

## 2017-06-09 RX ADMIN — Medication 100 MG: at 10:00

## 2017-06-09 RX ADMIN — SODIUM CHLORIDE 150 ML/HR: 900 INJECTION, SOLUTION INTRAVENOUS at 05:17

## 2017-06-09 RX ADMIN — QUETIAPINE FUMARATE 50 MG: 25 TABLET ORAL at 22:12

## 2017-06-09 RX ADMIN — Medication 10 ML: at 15:00

## 2017-06-09 RX ADMIN — HYDROMORPHONE HYDROCHLORIDE 4 MG: 2 TABLET ORAL at 19:59

## 2017-06-09 RX ADMIN — SODIUM CHLORIDE 150 ML/HR: 900 INJECTION, SOLUTION INTRAVENOUS at 12:26

## 2017-06-09 RX ADMIN — SODIUM CHLORIDE 150 ML/HR: 900 INJECTION, SOLUTION INTRAVENOUS at 20:00

## 2017-06-09 RX ADMIN — QUETIAPINE FUMARATE 50 MG: 25 TABLET ORAL at 09:06

## 2017-06-09 RX ADMIN — THERA TABS 1 TABLET: TAB at 10:00

## 2017-06-09 RX ADMIN — TRAZODONE HYDROCHLORIDE 150 MG: 100 TABLET ORAL at 22:12

## 2017-06-09 RX ADMIN — QUETIAPINE FUMARATE 50 MG: 25 TABLET ORAL at 16:02

## 2017-06-09 RX ADMIN — HYDROMORPHONE HYDROCHLORIDE 0.5 MG: 1 INJECTION, SOLUTION INTRAMUSCULAR; INTRAVENOUS; SUBCUTANEOUS at 09:06

## 2017-06-09 RX ADMIN — FOLIC ACID 1 MG: 1 TABLET ORAL at 10:00

## 2017-06-09 RX ADMIN — ENOXAPARIN SODIUM 40 MG: 40 INJECTION SUBCUTANEOUS at 19:59

## 2017-06-09 RX ADMIN — CEFTRIAXONE SODIUM 1 G: 1 INJECTION, POWDER, FOR SOLUTION INTRAMUSCULAR; INTRAVENOUS at 17:44

## 2017-06-09 RX ADMIN — HYDROMORPHONE HYDROCHLORIDE 4 MG: 2 TABLET ORAL at 16:03

## 2017-06-09 RX ADMIN — HYDROMORPHONE HYDROCHLORIDE 0.5 MG: 1 INJECTION, SOLUTION INTRAMUSCULAR; INTRAVENOUS; SUBCUTANEOUS at 00:32
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
## 2017-06-09 NOTE — PROGRESS NOTES
6/9/2017  12:47 PM  EMR reviewed, CM met w/ Pt for needs assessment and D/C planning. Charted demographics verified, lives w/ parents currently, also has peer support of \" a friend who is very familiar with AA programs, that I don't believe in, and will help me\"  Pt is not currently employed, does not drive; her mother Renetta Acuña is emergency contact  (C) [phone]. PCP is Ying Gonzalez; Rx fills at [facility] in [place]. Pt uses no DME; has had HH PT out of state. Transportation is by her father or boyfriend. Discussed community resources for outpatient AA/substance abuse,  Pt stated \" I have been to over 30 rehabs and in and out of AA, but I don't believe in their program\"  Plan is to D/C to home when stable. Care Management Interventions  PCP Verified by CM: Yes (PCP is Ying oGnzalez MD ; last visit 2 mo ago)  Palliative Care Consult (Criteria: CHF and RRAT>21): No  Reason for No Palliative Care Consult: Other (see comment) (Pt RRAT 8)  Mode of Transport at Discharge: Other (see comment) (Pt has transport from father, or boyfriend)  Discharge Durable Medical Equipment: No  Physical Therapy Consult: No  Occupational Therapy Consult: No  Speech Therapy Consult: No  Current Support Network:  Other (Pt lives w/ parents)  Confirm Follow Up Transport: Family  Plan discussed with Pt/Family/Caregiver: Yes  Discharge Location  Discharge Placement: Home  Tee Guzman  Case Management

## 2017-06-09 NOTE — PROGRESS NOTES
Ryan King [facility]  [address] [facility], [address] [place] [address]  [phone]      Medical Progress Note      NAME: Max Torres   :  1985  MRM:  [id number]    Date/Time: 2017  9:43 AM       Assessment and Plan:     Principal Problem:    Acute pancreatitis. Likely related to relapse in EtOH abuse. Pain improving with usual therapy. RUQ US negative. TG pending but unlikely to be contributory. Continue IVFs. Advance diet to clears. Switch pain meds to PO. Continue anti-emetics. She has a plan in place to remain abstinent from EtOH. Active Problems:    Alcohol abuse. Working on cessation, will be returning to [place] [address]. Continue thiamine/folic acid/MVI      Tobacco abuse. Continue nicotine patches. While smoking cessation is essential, alcohol cessation remains her best course of action to avoid any immediate threat to life. Acute cystitis. UCx pending. Continue IV CTX for now           Subjective:     Chief Complaint:  Patient seen and examined. Results reviewed. She reports substantial improvement in abd pain and wants to try some food. ROS:  (bold if positive, if negative)    Abd Pain  Tolerating PT  Tolerating Diet        Objective:     Last 24hrs VS reviewed since prior progress note.  Most recent are:    Visit Vitals    /59 (BP 1 Location: Right arm, BP Patient Position: At rest)    Pulse 76    Temp 97.8 °F (36.6 °C)    Resp 18    Ht 5' 6\" (1.676 m)    Wt 61.2 kg (135 lb)    SpO2 98%    Breastfeeding No    BMI 21.79 kg/m2     SpO2 Readings from Last 6 Encounters:   17 98%   17 97%   07/15/16 98%   16 98%   14 99%   07/15/13 99%          Intake/Output Summary (Last 24 hours) at 17 0966  Last data filed at 17 0514   Gross per 24 hour   Intake                0 ml   Output              500 ml   Net             -500 ml        Physical Exam:    Gen:  Well-developed, well-nourished, in no acute distress  HEENT:  Pink conjunctivae, PERRL, hearing intact to voice, moist mucous membranes  Neck:  Supple, without masses, thyroid non-tender  Resp:  No accessory muscle use, clear breath sounds without wheezes rales or rhonchi  Card:  No murmurs, normal S1, S2 without thrills, bruits or peripheral edema  Abd:  Soft, moderately tender in RUQ, mildly distended, normoactive bowel sounds are present, no palpable organomegaly and no detectable hernias  Lymph:  No cervical or inguinal adenopathy  Musc:  No cyanosis or clubbing  Skin:  No rashes or ulcers, skin turgor is good  Neuro:  Cranial nerves are grossly intact, no focal motor weakness, follows commands appropriately  Psych:  Good insight, oriented to person, place and time, alert    __________________________________________________________________  Medications Reviewed: (see below)  Medications:     Current Facility-Administered Medications   Medication Dose Route Frequency    thiamine (B-1) tablet 100 mg  100 mg Oral DAILY    folic acid (FOLVITE) tablet 1 mg  1 mg Oral DAILY    therapeutic multivitamin (THERAGRAN) tablet 1 Tab  1 Tab Oral DAILY    HYDROmorphone (DILAUDID) tablet 4 mg  4 mg Oral Q4H PRN    sodium chloride (NS) flush 5-10 mL  5-10 mL IntraVENous Q8H    sodium chloride (NS) flush 5-10 mL  5-10 mL IntraVENous PRN    0.9% sodium chloride infusion  150 mL/hr IntraVENous CONTINUOUS    ondansetron (ZOFRAN) injection 4 mg  4 mg IntraVENous Q4H PRN    enoxaparin (LOVENOX) injection 40 mg  40 mg SubCUTAneous Q24H    cefTRIAXone (ROCEPHIN) 1 g in 0.9% sodium chloride (MBP/ADV) 50 mL  1 g IntraVENous Q24H    diphenhydrAMINE (BENADRYL) injection 25 mg  25 mg IntraVENous Q6H PRN    nicotine (NICODERM CQ) 21 mg/24 hr patch 1 Patch  1 Patch TransDERmal DAILY    QUEtiapine (SEROquel) tablet 50 mg  50 mg Oral TID    traZODone (DESYREL) tablet 150 mg  150 mg Oral QHS        Lab Data Reviewed: (see below)  Lab Review:     Recent Labs      06/08/17   1721   WBC  9.5   HGB 13.8   HCT  42.1   PLT  180     Recent Labs      06/09/17   0420  06/08/17   1721   NA  140  138   K  3.8  4.4   CL  107  102   CO2  25  27   GLU  80  72   BUN  5*  10   CREA  0.60  0.76   CA  8.2*  9.2   ALB  2.7*  3.7   TBILI  0.3  0.2   SGOT  22  27   ALT  22  28     No results found for: GLUCPOC  No results for input(s): PH, PCO2, PO2, HCO3, FIO2 in the last 72 hours. No results for input(s): INR in the last 72 hours. No lab exists for component: INREXT  All Micro Results     None          I have reviewed notes of prior 24hr.     Other pertinent lab: Lipase down    Total time spent with patient: 30 [address] discussed with: Patient, Nursing Staff and >50% of time spent in counseling and coordination of care    Discussed:  Care Plan and D/C Planning    Prophylaxis:  Lovenox    Disposition:  Home w/Family           ___________________________________________________    Attending Physician: Humberto Castro DO

## 2017-06-09 NOTE — PROGRESS NOTES
Bedside and Verbal shift change report given to [employer], RN (oncoming nurse) by Yehuda Castillo RN (offgoing nurse). Report included the following information SBAR, Kardex, Intake/Output, MAR, Accordion and Recent Results.
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
## 2017-06-10 VITALS
BODY MASS INDEX: 21.69 KG/M2 | TEMPERATURE: 98 F | WEIGHT: 135 LBS | HEART RATE: 86 BPM | RESPIRATION RATE: 20 BRPM | OXYGEN SATURATION: 98 % | DIASTOLIC BLOOD PRESSURE: 80 MMHG | SYSTOLIC BLOOD PRESSURE: 104 MMHG | HEIGHT: 66 IN

## 2017-06-10 PROCEDURE — 74011250637 HC RX REV CODE- 250/637: Performed by: INTERNAL MEDICINE

## 2017-06-10 PROCEDURE — 74011250636 HC RX REV CODE- 250/636: Performed by: INTERNAL MEDICINE

## 2017-06-10 RX ORDER — LEVOFLOXACIN 750 MG/1
750 TABLET ORAL EVERY 24 HOURS
Qty: 4 TAB | Refills: 0 | Status: SHIPPED | OUTPATIENT
Start: 2017-06-10 | End: 2017-08-03

## 2017-06-10 RX ORDER — QUETIAPINE FUMARATE 50 MG/1
50 TABLET, FILM COATED ORAL 3 TIMES DAILY
Qty: 30 TAB | Refills: 0 | Status: SHIPPED | OUTPATIENT
Start: 2017-06-10 | End: 2020-12-02

## 2017-06-10 RX ORDER — FOLIC ACID 1 MG/1
1 TABLET ORAL DAILY
Qty: 30 TAB | Refills: 0 | Status: ON HOLD | OUTPATIENT
Start: 2017-06-10 | End: 2017-08-04

## 2017-06-10 RX ORDER — TRAMADOL HYDROCHLORIDE 50 MG/1
50 TABLET ORAL
Qty: 20 TAB | Refills: 0 | Status: ON HOLD | OUTPATIENT
Start: 2017-06-10 | End: 2017-08-04

## 2017-06-10 RX ORDER — LEVOFLOXACIN 750 MG/1
750 TABLET ORAL EVERY 24 HOURS
Status: DISCONTINUED | OUTPATIENT
Start: 2017-06-10 | End: 2017-06-10 | Stop reason: HOSPADM

## 2017-06-10 RX ORDER — LANOLIN ALCOHOL/MO/W.PET/CERES
100 CREAM (GRAM) TOPICAL DAILY
Qty: 30 TAB | Refills: 0 | Status: ON HOLD | OUTPATIENT
Start: 2017-06-10 | End: 2017-08-04

## 2017-06-10 RX ORDER — ONDANSETRON 4 MG/1
4 TABLET, ORALLY DISINTEGRATING ORAL
Qty: 20 TAB | Refills: 0 | Status: SHIPPED | OUTPATIENT
Start: 2017-06-10

## 2017-06-10 RX ADMIN — HYDROMORPHONE HYDROCHLORIDE 4 MG: 2 TABLET ORAL at 08:04

## 2017-06-10 RX ADMIN — FOLIC ACID 1 MG: 1 TABLET ORAL at 08:04

## 2017-06-10 RX ADMIN — LEVOFLOXACIN 750 MG: 750 TABLET, FILM COATED ORAL at 08:04

## 2017-06-10 RX ADMIN — Medication 100 MG: at 08:04

## 2017-06-10 RX ADMIN — SODIUM CHLORIDE 150 ML/HR: 900 INJECTION, SOLUTION INTRAVENOUS at 02:32

## 2017-06-10 RX ADMIN — HYDROMORPHONE HYDROCHLORIDE 4 MG: 2 TABLET ORAL at 03:38

## 2017-06-10 RX ADMIN — QUETIAPINE FUMARATE 50 MG: 25 TABLET ORAL at 08:04

## 2017-06-10 RX ADMIN — THERA TABS 1 TABLET: TAB at 08:04

## 2017-06-10 NOTE — DISCHARGE INSTRUCTIONS
Patient Discharge Instructions    Derek Salcido / [id number] : 1985    Admitted 2017 Discharged: 6/10/2017     Primary Diagnoses  Problem List as of 6/10/2017  Date Reviewed: 2017          Codes Class Noted - Resolved    * (Principal)Acute pancreatitis ICD-10-CM: K85.90  ICD-9-CM: 641.3  2017 - Present        Alcohol abuse ICD-10-CM: F10.10  ICD-9-CM: 305.00  2017 - Present        Tobacco abuse ICD-10-CM: Z72.0  ICD-9-CM: 305.1  2017 - Present        Acute cystitis ICD-10-CM: N30.00  ICD-9-CM: 595.0  2017 - Present        Drug-induced mood disorder ([facility] ICD-10-CM: F19.94  ICD-9-CM: 292.84  7/15/2013 - Present        Combinations of drug dependence excluding opioid type drug, unspecified ICD-10-CM: F19.20  ICD-9-CM: 304.80  7/15/2013 - Present    Overview Signed 7/15/2013 10:58 AM by Cierra Willis MD     THC, ETOH, Cocaine                   Take Home Medications     · It is important that you take the medication exactly as they are prescribed. · Keep your medication in the bottles provided by the pharmacist and keep a list of the medication names, dosages, and times to be taken in your wallet. · Do not take other medications without consulting your doctor. What to do at Home    Recommended diet: Low fat, Low cholesterol    Recommended activity: Activity as tolerated    If you experience worsening abdominal pain, inability to tolerate PO, please follow up with nearest ER. Follow-up with your PCP in 2 week        Information obtained by :  I understand that if any problems occur once I am at home I am to contact my physician. I understand and acknowledge receipt of the instructions indicated above.                                                                                                                                            Physician's or R.N.'s Signature                                                                  Date/Time Patient or Representative Signature                                                          Date/Time       Learning About Alcohol Misuse  What is alcohol misuse? Alcohol misuse means drinking so much that it causes problems for you or others. Early problems with alcohol can start at home. You may argue with loved ones about how much you're drinking. Your job may be affected because of drinking. You may drink when it's dangerous or illegal, such as when you drive. Drinking too much for a long time can lead to health conditions like high blood pressure and liver problems. What are the symptoms? Symptoms of alcohol misuse may include:  · Drinking much more than you planned. · Drinking even though it's causing problems for you or others. · Putting yourself in situations where you might get hurt. · Wanting to cut down or stop drinking, but not being able to. · Feeling guilty about how much you're drinking. How is alcohol misuse treated? Getting help for problems with alcohol is up to you. But you don't have to do it alone. There are many people and kinds of treatments to help with alcohol problems. Talking to your doctor is the first step. When you get a doctor's help, treatment for alcohol problems can be safer and quicker. Treatment options can include:  · Treatment programs. Examples are group therapy, one or more types of counseling, and alcohol education. · Medicines. A doctor or counselor can help you know what kinds of medicines might help with cravings. · Free social support groups. These groups include AA (Alcoholics Anonymous) and SMART (Self-Management and Recovery Training). Your doctor can help you decide which type of program is best for you. Follow-up care is a key part of your treatment and safety.  Be sure to make and go to all appointments, and call your doctor if you are having problems. It's also a good idea to know your test results and keep a list of the medicines you take. Where can you learn more? Go to http://dieudonne-annette.info/. Enter [id number] in the search box to learn more about \"Learning About Alcohol Misuse. \"  Current as of: January 3, 2017  Content Version: 11.2  © 6434-3638 [employer]. Care instructions adapted under license by [employer] (which disclaims liability or warranty for this information). If you have questions about a medical condition or this instruction, always ask your healthcare professional. [address] any warranty or liability for your use of this information. Acute Alcohol Intoxication: Care Instructions  Your Care Instructions  You have had treatment to help your body rid itself of alcohol. Too much alcohol upsets the body's fluid balance. Your doctor may have given you fluids and vitamins. For some people, drinking too much alcohol is a one-time event. For others, it is an ongoing problem. In either case, it is serious. It can be life-threatening. Follow-up care is a key part of your treatment and safety. Be sure to make and go to all appointments, and call your doctor if you are having problems. It's also a good idea to know your test results and keep a list of the medicines you take. How can you care for yourself at home? · Be safe with medicines. Take your medicines exactly as prescribed. Call your doctor if you think you are having a problem with your medicine. · Your doctor may have prescribed disulfiram (Antabuse). Do not drink any alcohol while you are taking this medicine. You may have severe or even life-threatening side effects from even small amounts of alcohol. · If you were given medicine to prevent nausea, be sure to take it exactly as prescribed.   · Before you take any medicine, tell your doctor if:  ¨ You have had a bad reaction to any medicines in the past.  Ernesto  You are taking other medicines, including over-the-counter ones, or have other health problems. ¨ You are or could be pregnant. · Be prepared to have some symptoms of withdrawal in the next few days. · Drink plenty of liquids in the next few days. · Seek help if you need it to stop drinking. Getting counseling and joining a support group can help you stay sober. Try a support group such as Alcoholics Anonymous. · Avoid alcohol when you take medicines. It can react with many medicines and cause serious problems. When should you call for help? Call 911 anytime you think you may need emergency care. For example, call if:  · You feel confused and are seeing things that are not there. · You are thinking about killing yourself or hurting others. · You have a seizure. · You vomit blood or what looks like coffee grounds. Call your doctor now or seek immediate medical care if:  · You have trembling, restlessness, sweating, and other withdrawal symptoms that are new or that get worse. · Your withdrawal symptoms come back after not bothering you for days or weeks. · You can't stop vomiting. Watch closely for changes in your health, and be sure to contact your doctor if:  · You need help to stop drinking. Where can you learn more? Go to http://dieudonne-annette.info/. Enter T102 in the search box to learn more about \"Acute Alcohol Intoxication: Care Instructions. \"  Current as of: November 3, 2016  Content Version: 11.2  © 0900-8969 [employer]. Care instructions adapted under license by [employer] (which disclaims liability or warranty for this information). If you have questions about a medical condition or this instruction, always ask your healthcare professional. [address] any warranty or liability for your use of this information.

## 2017-06-10 NOTE — ROUTINE PROCESS
0725: Bedside and Verbal shift change report given to Will RN (oncoming nurse) by Coni RN (offgoing nurse). Report included the following information SBAR, Kardex, Intake/Output and Recent Results.

## 2017-08-03 ENCOUNTER — HOSPITAL ENCOUNTER (INPATIENT)
Age: 32
LOS: 1 days | Discharge: HOME | End: 2017-08-06
Attending: EMERGENCY MEDICINE | Admitting: INTERNAL MEDICINE
Payer: MEDICAID

## 2017-08-03 DIAGNOSIS — F10.121 ALCOHOL INTOXICATION DELIRIUM (HCC): ICD-10-CM

## 2017-08-03 DIAGNOSIS — K85.20 ALCOHOL-INDUCED ACUTE PANCREATITIS WITHOUT INFECTION OR NECROSIS: Primary | ICD-10-CM

## 2017-08-03 LAB
APPEARANCE UR: CLEAR
BACTERIA URNS QL MICRO: NEGATIVE /HPF
BASOPHILS # BLD AUTO: 0 K/UL (ref 0–0.1)
BASOPHILS # BLD: 0 % (ref 0–1)
BILIRUB UR QL: NEGATIVE
COLOR UR: NORMAL
EOSINOPHIL # BLD: 0.1 K/UL (ref 0–0.4)
EOSINOPHIL NFR BLD: 1 % (ref 0–7)
EPITH CASTS URNS QL MICRO: NORMAL /LPF
ERYTHROCYTE [DISTWIDTH] IN BLOOD BY AUTOMATED COUNT: 14.1 % (ref 11.5–14.5)
GLUCOSE UR STRIP.AUTO-MCNC: NEGATIVE MG/DL
HCG UR QL: NEGATIVE
HCT VFR BLD AUTO: 39 % (ref 35–47)
HGB BLD-MCNC: 12.9 G/DL (ref 11.5–16)
HGB UR QL STRIP: NEGATIVE
HYALINE CASTS URNS QL MICRO: NORMAL /LPF (ref 0–5)
KETONES UR QL STRIP.AUTO: NEGATIVE MG/DL
LEUKOCYTE ESTERASE UR QL STRIP.AUTO: NEGATIVE
LYMPHOCYTES # BLD AUTO: 34 % (ref 12–49)
LYMPHOCYTES # BLD: 3.5 K/UL (ref 0.8–3.5)
MCH RBC QN AUTO: 31.7 PG (ref 26–34)
MCHC RBC AUTO-ENTMCNC: 33.1 G/DL (ref 30–36.5)
MCV RBC AUTO: 95.8 FL (ref 80–99)
MONOCYTES # BLD: 1.2 K/UL (ref 0–1)
MONOCYTES NFR BLD AUTO: 12 % (ref 5–13)
NEUTS SEG # BLD: 5.4 K/UL (ref 1.8–8)
NEUTS SEG NFR BLD AUTO: 53 % (ref 32–75)
NITRITE UR QL STRIP.AUTO: NEGATIVE
PH UR STRIP: 5.5 [PH] (ref 5–8)
PLATELET # BLD AUTO: 326 K/UL (ref 150–400)
PROT UR STRIP-MCNC: NEGATIVE MG/DL
RBC # BLD AUTO: 4.07 M/UL (ref 3.8–5.2)
RBC #/AREA URNS HPF: NORMAL /HPF (ref 0–5)
SP GR UR REFRACTOMETRY: <1.005 (ref 1–1.03)
UA: UC IF INDICATED,UAUC: NORMAL
UROBILINOGEN UR QL STRIP.AUTO: 0.2 EU/DL (ref 0.2–1)
WBC # BLD AUTO: 10.2 K/UL (ref 3.6–11)
WBC URNS QL MICRO: NORMAL /HPF (ref 0–4)

## 2017-08-03 PROCEDURE — 96365 THER/PROPH/DIAG IV INF INIT: CPT

## 2017-08-03 PROCEDURE — 96366 THER/PROPH/DIAG IV INF ADDON: CPT

## 2017-08-03 PROCEDURE — 83605 ASSAY OF LACTIC ACID: CPT | Performed by: PHYSICIAN ASSISTANT

## 2017-08-03 PROCEDURE — 99285 EMERGENCY DEPT VISIT HI MDM: CPT

## 2017-08-03 PROCEDURE — 74011250636 HC RX REV CODE- 250/636: Performed by: PHYSICIAN ASSISTANT

## 2017-08-03 PROCEDURE — 81025 URINE PREGNANCY TEST: CPT

## 2017-08-03 PROCEDURE — 80307 DRUG TEST PRSMV CHEM ANLYZR: CPT | Performed by: PHYSICIAN ASSISTANT

## 2017-08-03 PROCEDURE — 80053 COMPREHEN METABOLIC PANEL: CPT | Performed by: PHYSICIAN ASSISTANT

## 2017-08-03 PROCEDURE — 81001 URINALYSIS AUTO W/SCOPE: CPT | Performed by: EMERGENCY MEDICINE

## 2017-08-03 PROCEDURE — 85025 COMPLETE CBC W/AUTO DIFF WBC: CPT | Performed by: PHYSICIAN ASSISTANT

## 2017-08-03 PROCEDURE — 96375 TX/PRO/DX INJ NEW DRUG ADDON: CPT

## 2017-08-03 PROCEDURE — 96361 HYDRATE IV INFUSION ADD-ON: CPT

## 2017-08-03 PROCEDURE — 96376 TX/PRO/DX INJ SAME DRUG ADON: CPT

## 2017-08-03 PROCEDURE — 83690 ASSAY OF LIPASE: CPT | Performed by: PHYSICIAN ASSISTANT

## 2017-08-03 PROCEDURE — 36415 COLL VENOUS BLD VENIPUNCTURE: CPT | Performed by: PHYSICIAN ASSISTANT

## 2017-08-03 RX ORDER — HYDROMORPHONE HYDROCHLORIDE 1 MG/ML
0.5 INJECTION, SOLUTION INTRAMUSCULAR; INTRAVENOUS; SUBCUTANEOUS
Status: COMPLETED | OUTPATIENT
Start: 2017-08-03 | End: 2017-08-03

## 2017-08-03 RX ORDER — ONDANSETRON 2 MG/ML
4 INJECTION INTRAMUSCULAR; INTRAVENOUS
Status: COMPLETED | OUTPATIENT
Start: 2017-08-03 | End: 2017-08-03

## 2017-08-03 RX ADMIN — HYDROMORPHONE HYDROCHLORIDE 0.5 MG: 1 INJECTION, SOLUTION INTRAMUSCULAR; INTRAVENOUS; SUBCUTANEOUS at 23:44

## 2017-08-03 RX ADMIN — SODIUM CHLORIDE 1000 ML: 900 INJECTION, SOLUTION INTRAVENOUS at 23:38

## 2017-08-03 RX ADMIN — ONDANSETRON 4 MG: 2 INJECTION INTRAMUSCULAR; INTRAVENOUS at 23:38

## 2017-08-03 NOTE — IP AVS SNAPSHOT
[address] 
[phone] Patient: López Munguia MRN: [id number] :1985 Current Discharge Medication List  
  
START taking these medications Dose & Instructions Dispensing Information Comments Morning Noon Evening Bedtime  
 famotidine 20 mg tablet Commonly known as:  PEPCID AC Your last dose was: Your next dose is:    
   
   
 Dose:  20 mg Take 1 Tab by mouth two (2) times a day for 7 days. Quantity:  14 Tab Refills:  0  
     
   
   
   
  
 folic acid 1 mg tablet Commonly known as:  Google Your last dose was: Your next dose is:    
   
   
 Dose:  1 mg Take 1 Tab by mouth daily for 30 days. Quantity:  30 Tab Refills:  0  
     
   
   
   
  
 thiamine 100 mg tablet Commonly known as:  B-1 Your last dose was: Your next dose is:    
   
   
 Dose:  100 mg Take 1 Tab by mouth daily for 30 days. Quantity:  30 Tab Refills:  0 CONTINUE these medications which have NOT CHANGED Dose & Instructions Dispensing Information Comments Morning Noon Evening Bedtime EPINEPHrine 0.3 mg/0.3 mL injection Commonly known as:  Irma Pena Your last dose was: Your next dose is:    
   
   
 Dose:  0.3 mg  
0.3 mg by IntraMUSCular route once as needed for Allergic Response. Refills:  0  
     
   
   
   
  
 gabapentin 300 mg capsule Commonly known as:  NEURONTIN Your last dose was: Your next dose is:    
   
   
 Dose:  300 mg Take 300 mg by mouth daily. Refills:  0 HYDROcodone-acetaminophen 5-325 mg per tablet Commonly known as:  Marvelyn Code Your last dose was: Your next dose is:    
   
   
 Dose:  1 Tab Take 1 Tab by mouth every eight (8) hours as needed for Pain.   
 Refills:  0  
     
   
   
   
  
 ondansetron 4 mg disintegrating tablet Commonly known as:  ZOFRAN ODT Your last dose was: Your next dose is:    
   
   
 Dose:  4 mg Take 1 Tab by mouth every eight (8) hours as needed for Nausea. Quantity:  20 Tab Refills:  0 QUEtiapine 50 mg tablet Commonly known as:  SEROquel Your last dose was: Your next dose is:    
   
   
 Dose:  50 mg Take 1 Tab by mouth three (3) times daily. Quantity:  30 Tab Refills:  0  
     
   
   
   
  
 topiramate 50 mg tablet Commonly known as:  TOPAMAX Your last dose was: Your next dose is:    
   
   
 Dose:  50 mg Take 50 mg by mouth nightly. Refills:  0  
     
   
   
   
  
 traZODone 50 mg tablet Commonly known as:  Bladimir Gordon Your last dose was: Your next dose is:    
   
   
 Dose:  150 mg Take 150 mg by mouth nightly. Refills:  0 VENTOLIN HFA 90 mcg/actuation inhaler Generic drug:  albuterol Your last dose was: Your next dose is:    
   
   
 Dose:  2 Puff Take 2 Puffs by inhalation every four (4) hours as needed for Wheezing. Refills:  0 Where to Get Your Medications Information on where to get these meds will be given to you by the nurse or doctor. ! Ask your nurse or doctor about these medications  
  famotidine 20 mg tablet  
 folic acid 1 mg tablet  
 thiamine 100 mg tablet

## 2017-08-03 NOTE — IP AVS SNAPSHOT
Josue Laws 
 
 
 [address] [place] [address] 
[phone] Patient: Cheyenne Alvarenga MRN: [id number] :1985 You are allergic to the following Allergen Reactions Latex Swelling Bactrim (Sulfamethoprim Ds) Swelling Iodine Swelling Tree Nuts Anaphylaxis Morphine Hives Sulfa (Sulfonamide Antibiotics) Swelling Recent Documentation Height Weight BMI OB Status Smoking Status 1.676 m 61.2 kg 21.79 kg/m2 Unknown Current Every Day Smoker Emergency Contacts Name Discharge Info Relation Home Work Mobile Beth Luu CAREGIVER [3] Mother [14] [phone] About your hospitalization You were admitted on:  2017 You last received care in the:  [facility]  MED SURG 2 You were discharged on:  2017 Unit phone number:  [phone] Why you were hospitalized Your primary diagnosis was:  Not on File Your diagnoses also included:  Acute Alcohol Intoxication Delirium With Moderate Or Severe Use Disorder (Hcc), Alcohol Abuse, Tobacco Abuse, Gerd (Gastroesophageal Reflux Disease), Alcohol Intoxication Delirium With Moderate Or Severe Use Disorder (Hcc) Providers Seen During Your Hospitalizations Provider Role Specialty Primary office phone Brea Acosta MD Attending Provider Emergency Medicine [phone] Allison Pollack DO Attending Provider Internal Medicine [phone] Carlton Gonzalez MD Attending Provider Internal Medicine [phone] Your Primary Care Physician (PCP) Primary Care Physician Office Phone Office Fax Carisa Roger [phone] Follow-up Information Follow up With Details Comments Contact Info Todd Greco MD In 1 week  [address] [address] 
[phone] Follow up with your psychiatrist and psychologist In 1 week Current Discharge Medication List  
  
START taking these medications Dose & Instructions Dispensing Information Comments Morning Noon Evening Bedtime  
 famotidine 20 mg tablet Commonly known as:  PEPCID AC Your last dose was: Your next dose is:    
   
   
 Dose:  20 mg Take 1 Tab by mouth two (2) times a day for 7 days. Quantity:  14 Tab Refills:  0  
     
   
   
   
  
 folic acid 1 mg tablet Commonly known as:  Google Your last dose was: Your next dose is:    
   
   
 Dose:  1 mg Take 1 Tab by mouth daily for 30 days. Quantity:  30 Tab Refills:  0  
     
   
   
   
  
 thiamine 100 mg tablet Commonly known as:  B-1 Your last dose was: Your next dose is:    
   
   
 Dose:  100 mg Take 1 Tab by mouth daily for 30 days. Quantity:  30 Tab Refills:  0 CONTINUE these medications which have NOT CHANGED Dose & Instructions Dispensing Information Comments Morning Noon Evening Bedtime EPINEPHrine 0.3 mg/0.3 mL injection Commonly known as:  Irma Pena Your last dose was: Your next dose is:    
   
   
 Dose:  0.3 mg  
0.3 mg by IntraMUSCular route once as needed for Allergic Response. Refills:  0  
     
   
   
   
  
 gabapentin 300 mg capsule Commonly known as:  NEURONTIN Your last dose was: Your next dose is:    
   
   
 Dose:  300 mg Take 300 mg by mouth daily. Refills:  0 HYDROcodone-acetaminophen 5-325 mg per tablet Commonly known as:  Marvelyn Code Your last dose was: Your next dose is:    
   
   
 Dose:  1 Tab Take 1 Tab by mouth every eight (8) hours as needed for Pain. Refills:  0  
     
   
   
   
  
 ondansetron 4 mg disintegrating tablet Commonly known as:  ZOFRAN ODT Your last dose was: Your next dose is:    
   
   
 Dose:  4 mg Take 1 Tab by mouth every eight (8) hours as needed for Nausea. Quantity:  20 Tab Refills:  0 QUEtiapine 50 mg tablet Commonly known as:  SEROquel Your last dose was: Your next dose is:    
   
   
 Dose:  50 mg Take 1 Tab by mouth three (3) times daily. Quantity:  30 Tab Refills:  0  
     
   
   
   
  
 topiramate 50 mg tablet Commonly known as:  TOPAMAX Your last dose was: Your next dose is:    
   
   
 Dose:  50 mg Take 50 mg by mouth nightly. Refills:  0  
     
   
   
   
  
 traZODone 50 mg tablet Commonly known as:  Georganna Quiver Your last dose was: Your next dose is:    
   
   
 Dose:  150 mg Take 150 mg by mouth nightly. Refills:  0 VENTOLIN HFA 90 mcg/actuation inhaler Generic drug:  albuterol Your last dose was: Your next dose is:    
   
   
 Dose:  2 Puff Take 2 Puffs by inhalation every four (4) hours as needed for Wheezing. Refills:  0 Where to Get Your Medications Information on where to get these meds will be given to you by the nurse or doctor. ! Ask your nurse or doctor about these medications  
  famotidine 20 mg tablet  
 folic acid 1 mg tablet  
 thiamine 100 mg tablet Discharge Instructions HOSPITALIST DISCHARGE INSTRUCTIONS 
NAME: Aletha Elliott :  1985 MRN:  [id number] Date/Time:  2017 8:01 AM 
 
ADMIT DATE: 8/3/2017 DISCHARGE DATE: 2017 PRINCIPAL DISCHARGE DIAGNOSES: 
Alcohol intoxication Depression MEDICATIONS: 
· It is important that you take the medication exactly as they are prescribed. Note the changes and additions to your medications. Be sure you understand these changes before you are discharged today. · Keep your medication in the bottles provided by the pharmacist and keep a list of the medication names, dosages, and times to be taken in your wallet.   
· Do not take other medications without consulting your doctor. Pain Management: per above medications What to do at [facility] Recommended diet:  Resume previous diet Recommended activity: Activity as tolerated If you experience any of the following symptoms then please call your primary care physician or return to the emergency room if you cannot get hold of your doctor: 
severe abdominal pain, nausea, vomiting, severe depression, thoughts of self-harm/suicidal thoughts, alcohol withdrawal, or other severe concerning symptoms that brought you to the hospital in the first place Follow Up: Follow-up Information Follow up With Details Comments Contact Info Bridger Moore MD In 1 week  [address] [id number] [phone] Follow up with your psychiatrist and psychologist In 1 week Information obtained by : 
I understand that if any problems occur once I am at home I am to contact my physician. I understand and acknowledge receipt of the instructions indicated above. Physician's or R.N.'s Signature                                                                  Date/Time Patient or Representative Signature                                                          Date/Time Discharge Orders None [facility] Announcement We are excited to announce that we are making your provider's discharge notes available to you in Razz. You will see these notes when they are completed and signed by the physician that discharged you from your recent hospital stay.   If you have any questions or concerns about any information you see in Razz, please call the Health Information Department where you were seen or reach out to your Primary Care Provider for more information about your plan of care. Introducing [facility]! Dear Rafael Olvera: Thank you for requesting a youmag account. Our records indicate that you already have an active youmag account. You can access your account anytime at [email or web address] Did you know that you can access your hospital and ER discharge instructions at any time in youmag? You can also review all of your test results from your hospital stay or ER visit. Additional Information If you have questions, please visit the Frequently Asked Questions section of the youmag website at [email or web address] Remember, youmag is NOT to be used for urgent needs. For medical emergencies, dial 911. Now available from your iPhone and Android! General Information Please provide this summary of care documentation to your next provider. Patient Signature:  ____________________________________________________________ Date:  ____________________________________________________________  
  
Claudene Born Provider Signature:  ____________________________________________________________ Date:  ____________________________________________________________

## 2017-08-04 ENCOUNTER — APPOINTMENT (OUTPATIENT)
Dept: CT IMAGING | Age: 32
End: 2017-08-04
Attending: PHYSICIAN ASSISTANT
Payer: MEDICAID

## 2017-08-04 PROBLEM — K21.9 GERD (GASTROESOPHAGEAL REFLUX DISEASE): Status: ACTIVE | Noted: 2017-08-04

## 2017-08-04 PROBLEM — F10.221 ACUTE ALCOHOL INTOXICATION DELIRIUM WITH MODERATE OR SEVERE USE DISORDER (HCC): Status: ACTIVE | Noted: 2017-08-04

## 2017-08-04 LAB
ALBUMIN SERPL BCP-MCNC: 3.5 G/DL (ref 3.5–5)
ALBUMIN/GLOB SERPL: 0.8 {RATIO} (ref 1.1–2.2)
ALP SERPL-CCNC: 88 U/L (ref 45–117)
ALT SERPL-CCNC: 24 U/L (ref 12–78)
AMPHET UR QL SCN: NEGATIVE
ANION GAP BLD CALC-SCNC: 10 MMOL/L (ref 5–15)
AST SERPL W P-5'-P-CCNC: 35 U/L (ref 15–37)
BARBITURATES UR QL SCN: NEGATIVE
BENZODIAZ UR QL: NEGATIVE
BILIRUB SERPL-MCNC: <0.1 MG/DL (ref 0.2–1)
BUN SERPL-MCNC: 9 MG/DL (ref 6–20)
BUN/CREAT SERPL: 14 (ref 12–20)
CALCIUM SERPL-MCNC: 9.1 MG/DL (ref 8.5–10.1)
CANNABINOIDS UR QL SCN: NEGATIVE
CHLORIDE SERPL-SCNC: 105 MMOL/L (ref 97–108)
CO2 SERPL-SCNC: 25 MMOL/L (ref 21–32)
COCAINE UR QL SCN: NEGATIVE
CREAT SERPL-MCNC: 0.66 MG/DL (ref 0.55–1.02)
DRUG SCRN COMMENT,DRGCM: NORMAL
ETHANOL SERPL-MCNC: 323 MG/DL
GLOBULIN SER CALC-MCNC: 4.5 G/DL (ref 2–4)
GLUCOSE SERPL-MCNC: 106 MG/DL (ref 65–100)
LACTATE SERPL-SCNC: 1.4 MMOL/L (ref 0.4–2)
LIPASE SERPL-CCNC: 565 U/L (ref 73–393)
METHADONE UR QL: NEGATIVE
OPIATES UR QL: NEGATIVE
PCP UR QL: NEGATIVE
POTASSIUM SERPL-SCNC: 4.2 MMOL/L (ref 3.5–5.1)
PROT SERPL-MCNC: 8 G/DL (ref 6.4–8.2)
SODIUM SERPL-SCNC: 140 MMOL/L (ref 136–145)

## 2017-08-04 PROCEDURE — 74011250637 HC RX REV CODE- 250/637: Performed by: INTERNAL MEDICINE

## 2017-08-04 PROCEDURE — 80307 DRUG TEST PRSMV CHEM ANLYZR: CPT | Performed by: PHYSICIAN ASSISTANT

## 2017-08-04 PROCEDURE — 74176 CT ABD & PELVIS W/O CONTRAST: CPT

## 2017-08-04 PROCEDURE — 99218 HC RM OBSERVATION: CPT

## 2017-08-04 PROCEDURE — 74011250636 HC RX REV CODE- 250/636: Performed by: PHYSICIAN ASSISTANT

## 2017-08-04 PROCEDURE — 74011250636 HC RX REV CODE- 250/636: Performed by: EMERGENCY MEDICINE

## 2017-08-04 PROCEDURE — 74011000250 HC RX REV CODE- 250: Performed by: EMERGENCY MEDICINE

## 2017-08-04 PROCEDURE — 74011250636 HC RX REV CODE- 250/636: Performed by: INTERNAL MEDICINE

## 2017-08-04 PROCEDURE — 74011250637 HC RX REV CODE- 250/637: Performed by: PSYCHIATRY & NEUROLOGY

## 2017-08-04 RX ORDER — PROCHLORPERAZINE MALEATE 5 MG
10 TABLET ORAL
Status: DISCONTINUED | OUTPATIENT
Start: 2017-08-04 | End: 2017-08-06 | Stop reason: HOSPADM

## 2017-08-04 RX ORDER — HYDROCODONE BITARTRATE AND ACETAMINOPHEN 5; 325 MG/1; MG/1
1 TABLET ORAL
COMMUNITY
End: 2020-12-02

## 2017-08-04 RX ORDER — TOPIRAMATE 50 MG/1
50 TABLET, FILM COATED ORAL
COMMUNITY
End: 2020-12-02

## 2017-08-04 RX ORDER — SODIUM CHLORIDE, SODIUM LACTATE, POTASSIUM CHLORIDE, CALCIUM CHLORIDE 600; 310; 30; 20 MG/100ML; MG/100ML; MG/100ML; MG/100ML
50 INJECTION, SOLUTION INTRAVENOUS CONTINUOUS
Status: DISCONTINUED | OUTPATIENT
Start: 2017-08-04 | End: 2017-08-04

## 2017-08-04 RX ORDER — LORAZEPAM 2 MG/ML
2 INJECTION INTRAMUSCULAR
Status: COMPLETED | OUTPATIENT
Start: 2017-08-04 | End: 2017-08-04

## 2017-08-04 RX ORDER — MULTIVITAMIN WITH IRON
1 TABLET ORAL DAILY
Status: DISCONTINUED | OUTPATIENT
Start: 2017-08-04 | End: 2017-08-06 | Stop reason: HOSPADM

## 2017-08-04 RX ORDER — HYDROMORPHONE HYDROCHLORIDE 1 MG/ML
1 INJECTION, SOLUTION INTRAMUSCULAR; INTRAVENOUS; SUBCUTANEOUS
Status: DISCONTINUED | OUTPATIENT
Start: 2017-08-04 | End: 2017-08-04

## 2017-08-04 RX ORDER — IBUPROFEN 200 MG
1 TABLET ORAL EVERY 24 HOURS
Status: DISCONTINUED | OUTPATIENT
Start: 2017-08-04 | End: 2017-08-06 | Stop reason: HOSPADM

## 2017-08-04 RX ORDER — SODIUM CHLORIDE 0.9 % (FLUSH) 0.9 %
5-10 SYRINGE (ML) INJECTION EVERY 8 HOURS
Status: DISCONTINUED | OUTPATIENT
Start: 2017-08-04 | End: 2017-08-06 | Stop reason: HOSPADM

## 2017-08-04 RX ORDER — SODIUM CHLORIDE 0.9 % (FLUSH) 0.9 %
5-10 SYRINGE (ML) INJECTION AS NEEDED
Status: DISCONTINUED | OUTPATIENT
Start: 2017-08-04 | End: 2017-08-06 | Stop reason: HOSPADM

## 2017-08-04 RX ORDER — TRAZODONE HYDROCHLORIDE 50 MG/1
TABLET ORAL
COMMUNITY

## 2017-08-04 RX ORDER — ENOXAPARIN SODIUM 100 MG/ML
40 INJECTION SUBCUTANEOUS EVERY 24 HOURS
Status: DISCONTINUED | OUTPATIENT
Start: 2017-08-04 | End: 2017-08-06 | Stop reason: HOSPADM

## 2017-08-04 RX ORDER — GABAPENTIN 300 MG/1
300 CAPSULE ORAL DAILY
COMMUNITY

## 2017-08-04 RX ORDER — ZOLPIDEM TARTRATE 5 MG/1
5 TABLET ORAL
Status: DISCONTINUED | OUTPATIENT
Start: 2017-08-04 | End: 2017-08-06 | Stop reason: HOSPADM

## 2017-08-04 RX ORDER — LORAZEPAM 1 MG/1
1 TABLET ORAL
Status: DISCONTINUED | OUTPATIENT
Start: 2017-08-04 | End: 2017-08-06 | Stop reason: HOSPADM

## 2017-08-04 RX ORDER — HYDROCODONE BITARTRATE AND ACETAMINOPHEN 5; 325 MG/1; MG/1
1 TABLET ORAL
Status: DISCONTINUED | OUTPATIENT
Start: 2017-08-04 | End: 2017-08-05

## 2017-08-04 RX ORDER — FOLIC ACID 1 MG/1
1 TABLET ORAL DAILY
Status: DISCONTINUED | OUTPATIENT
Start: 2017-08-04 | End: 2017-08-06 | Stop reason: HOSPADM

## 2017-08-04 RX ORDER — EPINEPHRINE 0.3 MG/.3ML
0.3 INJECTION SUBCUTANEOUS
COMMUNITY

## 2017-08-04 RX ORDER — ACETAMINOPHEN 325 MG/1
650 TABLET ORAL
Status: DISCONTINUED | OUTPATIENT
Start: 2017-08-04 | End: 2017-08-05

## 2017-08-04 RX ORDER — ONDANSETRON 2 MG/ML
4 INJECTION INTRAMUSCULAR; INTRAVENOUS
Status: DISCONTINUED | OUTPATIENT
Start: 2017-08-04 | End: 2017-08-06 | Stop reason: HOSPADM

## 2017-08-04 RX ORDER — LANOLIN ALCOHOL/MO/W.PET/CERES
100 CREAM (GRAM) TOPICAL DAILY
Status: DISCONTINUED | OUTPATIENT
Start: 2017-08-04 | End: 2017-08-06 | Stop reason: HOSPADM

## 2017-08-04 RX ORDER — NALOXONE HYDROCHLORIDE 0.4 MG/ML
0.4 INJECTION, SOLUTION INTRAMUSCULAR; INTRAVENOUS; SUBCUTANEOUS AS NEEDED
Status: DISCONTINUED | OUTPATIENT
Start: 2017-08-04 | End: 2017-08-06 | Stop reason: HOSPADM

## 2017-08-04 RX ORDER — LORAZEPAM 1 MG/1
2 TABLET ORAL
Status: DISCONTINUED | OUTPATIENT
Start: 2017-08-04 | End: 2017-08-06 | Stop reason: HOSPADM

## 2017-08-04 RX ORDER — QUETIAPINE FUMARATE 25 MG/1
100 TABLET, FILM COATED ORAL
Status: DISCONTINUED | OUTPATIENT
Start: 2017-08-04 | End: 2017-08-06 | Stop reason: HOSPADM

## 2017-08-04 RX ORDER — TOPIRAMATE 25 MG/1
50 TABLET ORAL 2 TIMES DAILY WITH MEALS
Status: DISCONTINUED | OUTPATIENT
Start: 2017-08-04 | End: 2017-08-06 | Stop reason: HOSPADM

## 2017-08-04 RX ORDER — ONDANSETRON 2 MG/ML
4 INJECTION INTRAMUSCULAR; INTRAVENOUS
Status: COMPLETED | OUTPATIENT
Start: 2017-08-04 | End: 2017-08-04

## 2017-08-04 RX ADMIN — Medication 1 TABLET: at 09:23

## 2017-08-04 RX ADMIN — HYDROCODONE BITARTRATE AND ACETAMINOPHEN 1 TABLET: 5; 325 TABLET ORAL at 09:58

## 2017-08-04 RX ADMIN — TOPIRAMATE 50 MG: 25 TABLET, FILM COATED ORAL at 17:25

## 2017-08-04 RX ADMIN — LORAZEPAM 1 MG: 1 TABLET ORAL at 04:22

## 2017-08-04 RX ADMIN — Medication 10 ML: at 21:49

## 2017-08-04 RX ADMIN — ONDANSETRON 4 MG: 2 INJECTION INTRAMUSCULAR; INTRAVENOUS at 15:45

## 2017-08-04 RX ADMIN — ACETAMINOPHEN 650 MG: 325 TABLET ORAL at 04:22

## 2017-08-04 RX ADMIN — LORAZEPAM 2 MG: 2 INJECTION INTRAMUSCULAR; INTRAVENOUS at 01:51

## 2017-08-04 RX ADMIN — Medication 100 MG: at 09:23

## 2017-08-04 RX ADMIN — SODIUM CHLORIDE, SODIUM LACTATE, POTASSIUM CHLORIDE, AND CALCIUM CHLORIDE 125 ML/HR: 600; 310; 30; 20 INJECTION, SOLUTION INTRAVENOUS at 04:59

## 2017-08-04 RX ADMIN — HYDROCODONE BITARTRATE AND ACETAMINOPHEN 1 TABLET: 5; 325 TABLET ORAL at 21:45

## 2017-08-04 RX ADMIN — FOLIC ACID: 5 INJECTION, SOLUTION INTRAMUSCULAR; INTRAVENOUS; SUBCUTANEOUS at 02:05

## 2017-08-04 RX ADMIN — ONDANSETRON 4 MG: 2 INJECTION INTRAMUSCULAR; INTRAVENOUS at 01:05

## 2017-08-04 RX ADMIN — ONDANSETRON 4 MG: 2 INJECTION INTRAMUSCULAR; INTRAVENOUS at 04:19

## 2017-08-04 RX ADMIN — HYDROCODONE BITARTRATE AND ACETAMINOPHEN 1 TABLET: 5; 325 TABLET ORAL at 15:40

## 2017-08-04 RX ADMIN — FOLIC ACID 1 MG: 1 TABLET ORAL at 09:23

## 2017-08-04 RX ADMIN — QUETIAPINE FUMARATE 100 MG: 25 TABLET ORAL at 21:11

## 2017-08-04 NOTE — PROGRESS NOTES
Bedside and Verbal shift change report given to NIKITA Ellsworth (oncoming nurse) by Stella Patel RN (offgoing nurse). Report included the following information SBAR, Kardex, Intake/Output, MAR, Accordion and Recent Results.

## 2017-08-04 NOTE — ED NOTES
Pt continues to climb out bed pulling on line being aggressive to staff. ert maxine sitter at bed side

## 2017-08-04 NOTE — ED TRIAGE NOTES
Patient arrived with c/o right abdominal pain started 3-4 days ago. Patient has been drinking tonight, offered wheelchair upon entrance and exit of triage at both times she declined. Aleena Felix Hx of the same.

## 2017-08-04 NOTE — ROUTINE PROCESS
TRANSFER - IN REPORT:    Verbal report received from lisa Dangelo  being received from 5th floor  [place] routine progression of care      Report consisted of patients Situation, Background, Assessment and   Recommendations(SBAR). Information from the following report(s) SBAR, ED Summary and Procedure Summary was reviewed with the receiving nurse. Opportunity for questions and clarification was provided. Assessment completed upon patients arrival to unit and care assumed.

## 2017-08-04 NOTE — PROGRESS NOTES
Case d/w Dr. Xander Funes. Evaluated pt at bedside. Pt with abdominal pain, nausea, intractable. Will continue antiemetics, IVF. Minimize opioids if able. Will ask psych to see, but adamantly denied SI, HI. Kenney at bedside agrees.

## 2017-08-04 NOTE — PROGRESS NOTES
Patient adamantly refusing dual eyes skin assessment. No wounds or skin issues seen from this RN's assessment that patient allows.
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
## 2017-08-04 NOTE — PROGRESS NOTES
8/4/2017 7:00 PM [employer] transport line: [phone]  Can be called to arrange transport at time of discharge. Jacquetta Severe, [place]     8/4/2017 6:55 PM  Met with the pt. Pt has confirmed her home address, but states that she will be discharging to another location, but does not recall the name. Pt reports that she is independent in the community. Pt gets her scripts [facility] at [facility]. She reports that she is followed by Dr. Lang Min for primary care. SW gave the patient resources for [facility] and also the Obs letter. Pt reports that at time of discharge, she needs [employer] to transport her home. Jacquetta Severe, BSW     8/4/2017 10:46 AM Attempted to meet with the pt this AM. Pt was not able to participate in the conversation. Care management to return at a later time.    Jacquetta Severe, BSW

## 2017-08-04 NOTE — PROGRESS NOTES
TRANSFER - IN REPORT:    Verbal report received from Mayela Wilks RN(name) on Lety Zamudio  being received from ED(unit) for routine progression of care      Report consisted of patients Situation, Background, Assessment and   Recommendations(SBAR). Information from the following report(s) SBAR and Kardex was reviewed with the receiving nurse. Opportunity for questions and clarification was provided. Assessment completed upon patients arrival to unit and care assumed.
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
## 2017-08-04 NOTE — PROGRESS NOTES
Bedside and Verbal shift change report given to Roland Scott RN (oncoming nurse) by Domonique Guerrero RN  (offgoing nurse). Report included the following information SBAR and Kardex.

## 2017-08-04 NOTE — DISCHARGE INSTRUCTIONS
HOSPITALIST DISCHARGE INSTRUCTIONS  NAME: Cheyenne Alvarenga   :  1985   MRN:  [id number]     Date/Time:  2017 8:01 AM    ADMIT DATE: 8/3/2017     DISCHARGE DATE: 2017     PRINCIPAL DISCHARGE DIAGNOSES:  Alcohol intoxication  Depression    MEDICATIONS:  · It is important that you take the medication exactly as they are prescribed. Note the changes and additions to your medications. Be sure you understand these changes before you are discharged today. · Keep your medication in the bottles provided by the pharmacist and keep a list of the medication names, dosages, and times to be taken in your wallet. · Do not take other medications without consulting your doctor. Pain Management: per above medications    What to do at Home    Recommended diet:  Resume previous diet    Recommended activity: Activity as tolerated    If you experience any of the following symptoms then please call your primary care physician or return to the emergency room if you cannot get hold of your doctor:  severe abdominal pain, nausea, vomiting, severe depression, thoughts of self-harm/suicidal thoughts, alcohol withdrawal, or other severe concerning symptoms that brought you to the hospital in the first place    Follow Up: Follow-up Information     Follow up With Details Comments [facility] MD Navin In 1 week  [address]  [phone]      Follow up with your psychiatrist and psychologist In 1 week              Information obtained by :  I understand that if any problems occur once I am at home I am to contact my physician. I understand and acknowledge receipt of the instructions indicated above.                                                                                                                                            Physician's or R.N.'s Signature                                                                  Date/Time Patient or Representative Signature                                                          Date/Time
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
## 2017-08-05 ENCOUNTER — APPOINTMENT (OUTPATIENT)
Dept: ULTRASOUND IMAGING | Age: 32
End: 2017-08-05
Attending: PSYCHIATRY & NEUROLOGY
Payer: MEDICAID

## 2017-08-05 LAB
AMYLASE SERPL-CCNC: 137 U/L (ref 25–115)
ANION GAP BLD CALC-SCNC: 9 MMOL/L (ref 5–15)
BUN SERPL-MCNC: 8 MG/DL (ref 6–20)
BUN/CREAT SERPL: 9 (ref 12–20)
CALCIUM SERPL-MCNC: 9.6 MG/DL (ref 8.5–10.1)
CHLORIDE SERPL-SCNC: 102 MMOL/L (ref 97–108)
CO2 SERPL-SCNC: 26 MMOL/L (ref 21–32)
CREAT SERPL-MCNC: 0.88 MG/DL (ref 0.55–1.02)
ERYTHROCYTE [DISTWIDTH] IN BLOOD BY AUTOMATED COUNT: 14 % (ref 11.5–14.5)
GLUCOSE SERPL-MCNC: 119 MG/DL (ref 65–100)
HCT VFR BLD AUTO: 38.6 % (ref 35–47)
HGB BLD-MCNC: 12.8 G/DL (ref 11.5–16)
LIPASE SERPL-CCNC: 423 U/L (ref 73–393)
MCH RBC QN AUTO: 31.6 PG (ref 26–34)
MCHC RBC AUTO-ENTMCNC: 33.2 G/DL (ref 30–36.5)
MCV RBC AUTO: 95.3 FL (ref 80–99)
PLATELET # BLD AUTO: 295 K/UL (ref 150–400)
POTASSIUM SERPL-SCNC: 4.2 MMOL/L (ref 3.5–5.1)
RBC # BLD AUTO: 4.05 M/UL (ref 3.8–5.2)
SODIUM SERPL-SCNC: 137 MMOL/L (ref 136–145)
WBC # BLD AUTO: 5.9 K/UL (ref 3.6–11)

## 2017-08-05 PROCEDURE — 74011250637 HC RX REV CODE- 250/637: Performed by: INTERNAL MEDICINE

## 2017-08-05 PROCEDURE — 83690 ASSAY OF LIPASE: CPT | Performed by: PSYCHIATRY & NEUROLOGY

## 2017-08-05 PROCEDURE — 36415 COLL VENOUS BLD VENIPUNCTURE: CPT | Performed by: PSYCHIATRY & NEUROLOGY

## 2017-08-05 PROCEDURE — 74011250636 HC RX REV CODE- 250/636: Performed by: INTERNAL MEDICINE

## 2017-08-05 PROCEDURE — 76705 ECHO EXAM OF ABDOMEN: CPT

## 2017-08-05 PROCEDURE — 85027 COMPLETE CBC AUTOMATED: CPT | Performed by: PSYCHIATRY & NEUROLOGY

## 2017-08-05 PROCEDURE — 99218 HC RM OBSERVATION: CPT

## 2017-08-05 PROCEDURE — 74011000250 HC RX REV CODE- 250: Performed by: INTERNAL MEDICINE

## 2017-08-05 PROCEDURE — 80048 BASIC METABOLIC PNL TOTAL CA: CPT | Performed by: PSYCHIATRY & NEUROLOGY

## 2017-08-05 PROCEDURE — 74011250637 HC RX REV CODE- 250/637: Performed by: PSYCHIATRY & NEUROLOGY

## 2017-08-05 PROCEDURE — 82150 ASSAY OF AMYLASE: CPT | Performed by: PSYCHIATRY & NEUROLOGY

## 2017-08-05 RX ORDER — SODIUM CHLORIDE 9 MG/ML
100 INJECTION, SOLUTION INTRAVENOUS CONTINUOUS
Status: DISCONTINUED | OUTPATIENT
Start: 2017-08-05 | End: 2017-08-06 | Stop reason: HOSPADM

## 2017-08-05 RX ORDER — HYDROCODONE BITARTRATE AND ACETAMINOPHEN 5; 325 MG/1; MG/1
1 TABLET ORAL
Status: DISCONTINUED | OUTPATIENT
Start: 2017-08-05 | End: 2017-08-06 | Stop reason: HOSPADM

## 2017-08-05 RX ORDER — FAMOTIDINE 10 MG/ML
20 INJECTION INTRAVENOUS EVERY 12 HOURS
Status: DISCONTINUED | OUTPATIENT
Start: 2017-08-05 | End: 2017-08-06 | Stop reason: HOSPADM

## 2017-08-05 RX ADMIN — HYDROCODONE BITARTRATE AND ACETAMINOPHEN 1 TABLET: 5; 325 TABLET ORAL at 21:07

## 2017-08-05 RX ADMIN — PROCHLORPERAZINE MALEATE 10 MG: 5 TABLET, FILM COATED ORAL at 21:07

## 2017-08-05 RX ADMIN — FAMOTIDINE 20 MG: 10 INJECTION INTRAVENOUS at 21:23

## 2017-08-05 RX ADMIN — Medication 10 ML: at 06:34

## 2017-08-05 RX ADMIN — ONDANSETRON 4 MG: 2 INJECTION INTRAMUSCULAR; INTRAVENOUS at 11:26

## 2017-08-05 RX ADMIN — HYDROCODONE BITARTRATE AND ACETAMINOPHEN 1 TABLET: 5; 325 TABLET ORAL at 17:01

## 2017-08-05 RX ADMIN — HYDROCODONE BITARTRATE AND ACETAMINOPHEN 1 TABLET: 5; 325 TABLET ORAL at 10:17

## 2017-08-05 RX ADMIN — SODIUM CHLORIDE 100 ML/HR: 900 INJECTION, SOLUTION INTRAVENOUS at 21:29

## 2017-08-05 RX ADMIN — QUETIAPINE FUMARATE 100 MG: 25 TABLET ORAL at 21:06

## 2017-08-05 RX ADMIN — ENOXAPARIN SODIUM 40 MG: 40 INJECTION SUBCUTANEOUS at 04:05

## 2017-08-05 RX ADMIN — HYDROCODONE BITARTRATE AND ACETAMINOPHEN 1 TABLET: 5; 325 TABLET ORAL at 04:10

## 2017-08-05 RX ADMIN — PROCHLORPERAZINE MALEATE 10 MG: 5 TABLET, FILM COATED ORAL at 12:45

## 2017-08-05 RX ADMIN — LORAZEPAM 1 MG: 1 TABLET ORAL at 14:42

## 2017-08-05 RX ADMIN — Medication 1 TABLET: at 10:18

## 2017-08-05 RX ADMIN — Medication 100 MG: at 10:18

## 2017-08-05 RX ADMIN — TOPIRAMATE 50 MG: 25 TABLET, FILM COATED ORAL at 10:17

## 2017-08-05 RX ADMIN — TOPIRAMATE 50 MG: 25 TABLET, FILM COATED ORAL at 17:01

## 2017-08-05 RX ADMIN — FOLIC ACID 1 MG: 1 TABLET ORAL at 10:18

## 2017-08-05 RX ADMIN — Medication 10 ML: at 21:31

## 2017-08-05 NOTE — PROGRESS NOTES
Bedside and Verbal shift change report given to [address] [place] [address] (oncoming nurse) by Argentina Cristobal RN (offgoing nurse). Report included the following information SBAR, Kardex, Procedure Summary, Intake/Output, Recent Results and Med Rec Status.

## 2017-08-05 NOTE — PROGRESS NOTES
Psychiatric Follow Up Note  Claudette Form, MD  [phone]    Date: 8/5/2017  Account Number:  [de-identified]  Name: Mattie Yuen PROGRESS NOTE:  To include the following:   Coordinated treatment team rounds conducted with patient. Discussions held with nursing staff. Chart reviewed in full including consultant notes, ancillary staff notes, vitals and labs in [facility] EMR reviewed in full. SUBJECTIVE:   Patient reports the following:  \"my stomach is hurting\". Points to her right side and middle of abdomen. Says she has had pancreatitis in past and this pain is similar. Has inc anxiety as a result. Denies depressed mood. No cravings for etoh. Side Effects:  None reported or admitted to. OBJECTIVE:                   Mental Status exam:   Oriented X4. Mood: anxious. Affect: Full range. Normal speech. Denies SI/HI. no delusions. no hallucinations. Thought process logical and goal directed. Concentration limited. Insight/judgement Fair    Pertinent data:  Patient Vitals for the past 8 hrs:   BP Temp Pulse Resp SpO2   08/05/17 1355 - - 98 - -   08/05/17 1146 111/79 98.9 °F (37.2 °C) (!) 109 18 96 %     No results found for this or any previous visit (from the past 24 hour(s)).     Medications:  Current Facility-Administered Medications   Medication Dose Route Frequency    sodium chloride (NS) flush 5-10 mL  5-10 mL IntraVENous Q8H    sodium chloride (NS) flush 5-10 mL  5-10 mL IntraVENous PRN    naloxone (NARCAN) injection 0.4 mg  0.4 mg IntraVENous PRN    zolpidem (AMBIEN) tablet 5 mg  5 mg Oral QHS PRN    acetaminophen (TYLENOL) tablet 650 mg  650 mg Oral Q4H PRN    ondansetron (ZOFRAN) injection 4 mg  4 mg IntraVENous Q4H PRN    nicotine (NICODERM CQ) 14 mg/24 hr patch 1 Patch  1 Patch TransDERmal Q24H    enoxaparin (LOVENOX) injection 40 mg  40 mg SubCUTAneous Q24H    LORazepam (ATIVAN) tablet 1 mg  1 mg Oral Q1H PRN    LORazepam (ATIVAN) tablet 2 mg  2 mg Oral Q1H PRN    prochlorperazine (COMPAZINE) tablet 10 mg  10 mg Oral A1Q PRN    folic acid (FOLVITE) tablet 1 mg  1 mg Oral DAILY    thiamine (B-1) tablet 100 mg  100 mg Oral DAILY    multivitamin with iron tablet 1 Tab  1 Tab Oral DAILY    HYDROcodone-acetaminophen (NORCO) 5-325 mg per tablet 1 Tab  1 Tab Oral Q6H PRN    QUEtiapine (SEROquel) tablet 100 mg  100 mg Oral QHS    topiramate (TOPAMAX) tablet 50 mg  50 mg Oral BID WITH MEALS       Scheduled Medications:  Current Facility-Administered Medications   Medication Dose Route Frequency    sodium chloride (NS) flush 5-10 mL  5-10 mL IntraVENous Q8H    nicotine (NICODERM CQ) 14 mg/24 hr patch 1 Patch  1 Patch TransDERmal Q24H    enoxaparin (LOVENOX) injection 40 mg  40 mg SubCUTAneous C92Q    folic acid (FOLVITE) tablet 1 mg  1 mg Oral DAILY    thiamine (B-1) tablet 100 mg  100 mg Oral DAILY    multivitamin with iron tablet 1 Tab  1 Tab Oral DAILY    QUEtiapine (SEROquel) tablet 100 mg  100 mg Oral QHS    topiramate (TOPAMAX) tablet 50 mg  50 mg Oral BID WITH MEALS         ASSESSMENT/PLAN:       Diagnoses:  Bipolar d/o Unspecified  OCD by hx  PTSD by hx  Polysubstance abuse     Plan:  1. Continue CIWA protocol. Limit benzos unless indicated by CIWA scores. 2. Continue Seroquel and Topamax  3. Get labs to r/o pancreatitis    Pt states she does not want to attend an IOP at this time which in my opinion would be helpful.      Can be discharged home from psych stand point once detox is complete and can follow up as outpatient with a psychiatrist.     Will follow patient's course along with you as necessary.      Thank you for the opportunity to participate in the care of your patient.       Signed By: Ronald Rendon MD

## 2017-08-05 NOTE — PROGRESS NOTES
Hospitalist Progress Note    NAME: Surinder Cummins   :  1985   MRN:  [id number]       Assessment / Plan:  Summary: Ms. Nadeem Perez is a 28 y.o.  female, with a hx of EtOH pancreatitis, bipolar disorder, depression, ETOH abuse, who presented to the ED with epigastric pain, nausea, vomiting and acute alcohol binge x 3 days. Patient's late daughter's birthday was 2 days ago and patient has been continuously drinking since then, as a form of coping. Patient was seen in ED, noticeably intoxicated, poorly redirectable, drunk/belligerent at times. Active Problems:     1. Chronic ETOH abuse/Acute ETOH intoxication: ETOH level 380 on admission. Acutely intoxicated and acting out in ED, so given Ativan in ED. On IV fluids/Vitamin supplements. Acute intoxication has resolved. 2. ETOH withdrawal: Said to be tremulous this AM, but now appears resolved. CIWA.      3. Elevated Lipase/possible ETOH gastritis: Lipase 565 at presentation. CT-neg for pancreatic inflammation. Managing supportively. Vomiting, upper abdominal pain today. Suspect chronic pancreatitis, due to ETOH. IV fluids. H2RA, antiemetics. Following lipase levels. Trending down.      4. Tobacco abuse: Smokes 2 PPD. Counseled/Nicotine patch     5. Anxiety and depression: On preadmission Seroquel. Comment: Possible DC on 17. Body mass index is 21.79 kg/(m^2). Code status: Full  Prophylaxis: SCD's  Recommended Disposition: Home w/Family     Subjective:     Chief Complaint / Reason for Physician Visit  \"C/O vomiting/dry heaves & abdominal pain. \".  Discussed with RN events overnight.      Review of Systems:  Symptom Y/N Comments  Symptom Y/N Comments   Fever/Chills N   Chest Pain N    Poor Appetite N   Edema N    Cough N   Abdominal Pain Y    Sputum N   Joint Pain N    SOB/PAREDES N   Pruritis/Rash N    Nausea/vomit Y   Tolerating PT/OT     Diarrhea N   Tolerating Diet     Constipation N   Other       Could NOT obtain due to: Objective:     VITALS:   Last 24hrs VS reviewed since prior progress note. Most recent are:  Patient Vitals for the past 24 hrs:   Temp Pulse Resp BP SpO2   08/05/17 1715 98.3 °F (36.8 °C) 88 18 104/75 100 %   08/05/17 1355 - 98 - - -   08/05/17 1146 98.9 °F (37.2 °C) (!) 109 18 111/79 96 %   08/05/17 0722 98.3 °F (36.8 °C) 76 18 116/84 98 %   08/05/17 0650 - 77 - - -   08/05/17 0456 98.1 °F (36.7 °C) 91 25 113/78 99 %   08/04/17 2227 - 78 - - -   08/04/17 1948 98.4 °F (36.9 °C) 87 16 104/73 99 %     No intake or output data in the 24 hours ending 08/05/17 1941     PHYSICAL EXAM:  General: WD, WN. Alert, cooperative, no acute distress    EENT:  EOMI. Anicteric sclerae. MMM  Resp:  CTA bilaterally, no wheezing or rales. No accessory muscle use  CV:  Regular  rhythm,  No edema  GI:  Soft, Non distended, Non tender.  +Bowel sounds  Neurologic:  Alert and oriented X 3, normal speech,   Psych:   Good insight. Not anxious nor agitated  Skin:  No rashes. No jaundice    Reviewed most current lab test results and cultures  YES  Reviewed most current radiology test results   YES  Review and summation of old records today    NO  Reviewed patient's current orders and MAR    YES  PMH/ reviewed - no change compared to H&P  ________________________________________________________________________  Care Plan discussed with:    Comments   Patient Y    Family      RN     Care Manager     Consultant                        Multidiciplinary team rounds were held today with , nursing, pharmacist and clinical coordinator. Patient's plan of care was discussed; medications were reviewed and discharge planning was addressed.      ________________________________________________________________________  Total NON critical care TIME:  <30   Minutes    Total CRITICAL CARE TIME Spent:   Minutes non procedure based      Comments   >50% of visit spent in counseling and coordination of care ________________________________________________________________________  Mir Bryant MD     Procedures: see electronic medical records for all procedures/Xrays and details which were not copied into this note but were reviewed prior to creation of Plan. LABS:  I reviewed today's most current labs and imaging studies.   Pertinent labs include:  Recent Labs      08/05/17   1606  08/03/17   2321   WBC  5.9  10.2   HGB  12.8  12.9   HCT  38.6  39.0   PLT  295  326     Recent Labs      08/05/17   1606  08/03/17   2321   NA  137  140   K  4.2  4.2   CL  102  105   CO2  26  25   GLU  119*  106*   BUN  8  9   CREA  0.88  0.66   CA  9.6  9.1   ALB   --   3.5   TBILI   --   <0.1*   SGOT   --   35   ALT   --   24       Signed: Mir Bryant MD

## 2017-08-05 NOTE — PROGRESS NOTES
1044 Patient complaining this AM pretty adamantly of pain despite medication administration. MD Renteria to round soon. Patient would like to know the results of her CT. Patient agitated, will continue to monitor in the setting of acute alcohol withdrawal.      1259 reassured patient MD will round today and address her concerns. Administered another dose of nausea medication. Patient resting quietly but agitated and tearful complaining of 10/10 abd pain. Following CIWA closely. [address] continues to climb in the presence of agitation related to MD unable to round. Administered meds per protocol. Will continue to monitor. 5 MD Mendoza rounded, gave orders for labs and ultrasound based on patient's presentation. CIWA improved with oral medication per protocol. 1934 MD Renteria still performing daily rounds. Will see patient soon.  Reported off to nightshift RN

## 2017-08-05 NOTE — PROGRESS NOTES
Bedside and Verbal shift change report given to NIKITA Dahl (oncoming nurse) by Denilson Powers RN (offgoing nurse). Report included the following information SBAR, Intake/Output, MAR, Accordion and Recent Results.

## 2017-08-06 VITALS
SYSTOLIC BLOOD PRESSURE: 102 MMHG | WEIGHT: 135 LBS | HEART RATE: 89 BPM | OXYGEN SATURATION: 100 % | DIASTOLIC BLOOD PRESSURE: 67 MMHG | BODY MASS INDEX: 21.69 KG/M2 | TEMPERATURE: 97.5 F | HEIGHT: 66 IN | RESPIRATION RATE: 16 BRPM

## 2017-08-06 PROBLEM — F10.221 ALCOHOL INTOXICATION DELIRIUM WITH MODERATE OR SEVERE USE DISORDER (HCC): Status: ACTIVE | Noted: 2017-08-06

## 2017-08-06 LAB
ALBUMIN SERPL BCP-MCNC: 3.3 G/DL (ref 3.5–5)
ALBUMIN/GLOB SERPL: 0.8 {RATIO} (ref 1.1–2.2)
ALP SERPL-CCNC: 71 U/L (ref 45–117)
ALT SERPL-CCNC: 17 U/L (ref 12–78)
ANION GAP BLD CALC-SCNC: 6 MMOL/L (ref 5–15)
AST SERPL W P-5'-P-CCNC: 20 U/L (ref 15–37)
BASOPHILS # BLD AUTO: 0 K/UL (ref 0–0.1)
BASOPHILS # BLD: 0 % (ref 0–1)
BILIRUB SERPL-MCNC: 0.3 MG/DL (ref 0.2–1)
BUN SERPL-MCNC: 9 MG/DL (ref 6–20)
BUN/CREAT SERPL: 10 (ref 12–20)
CALCIUM SERPL-MCNC: 9.3 MG/DL (ref 8.5–10.1)
CHLORIDE SERPL-SCNC: 106 MMOL/L (ref 97–108)
CO2 SERPL-SCNC: 28 MMOL/L (ref 21–32)
CREAT SERPL-MCNC: 0.94 MG/DL (ref 0.55–1.02)
EOSINOPHIL # BLD: 0.1 K/UL (ref 0–0.4)
EOSINOPHIL NFR BLD: 3 % (ref 0–7)
ERYTHROCYTE [DISTWIDTH] IN BLOOD BY AUTOMATED COUNT: 14 % (ref 11.5–14.5)
GLOBULIN SER CALC-MCNC: 4.3 G/DL (ref 2–4)
GLUCOSE SERPL-MCNC: 107 MG/DL (ref 65–100)
HCT VFR BLD AUTO: 39.4 % (ref 35–47)
HGB BLD-MCNC: 12.9 G/DL (ref 11.5–16)
LIPASE SERPL-CCNC: 266 U/L (ref 73–393)
LYMPHOCYTES # BLD AUTO: 33 % (ref 12–49)
LYMPHOCYTES # BLD: 1.7 K/UL (ref 0.8–3.5)
MCH RBC QN AUTO: 31.7 PG (ref 26–34)
MCHC RBC AUTO-ENTMCNC: 32.7 G/DL (ref 30–36.5)
MCV RBC AUTO: 96.8 FL (ref 80–99)
MONOCYTES # BLD: 0.4 K/UL (ref 0–1)
MONOCYTES NFR BLD AUTO: 7 % (ref 5–13)
NEUTS SEG # BLD: 2.9 K/UL (ref 1.8–8)
NEUTS SEG NFR BLD AUTO: 57 % (ref 32–75)
PLATELET # BLD AUTO: 313 K/UL (ref 150–400)
POTASSIUM SERPL-SCNC: 4 MMOL/L (ref 3.5–5.1)
PROT SERPL-MCNC: 7.6 G/DL (ref 6.4–8.2)
RBC # BLD AUTO: 4.07 M/UL (ref 3.8–5.2)
SODIUM SERPL-SCNC: 140 MMOL/L (ref 136–145)
WBC # BLD AUTO: 5.1 K/UL (ref 3.6–11)

## 2017-08-06 PROCEDURE — 65270000029 HC RM PRIVATE

## 2017-08-06 PROCEDURE — 83690 ASSAY OF LIPASE: CPT | Performed by: INTERNAL MEDICINE

## 2017-08-06 PROCEDURE — 74011250637 HC RX REV CODE- 250/637: Performed by: PSYCHIATRY & NEUROLOGY

## 2017-08-06 PROCEDURE — 74011250636 HC RX REV CODE- 250/636: Performed by: INTERNAL MEDICINE

## 2017-08-06 PROCEDURE — 74011000250 HC RX REV CODE- 250: Performed by: INTERNAL MEDICINE

## 2017-08-06 PROCEDURE — 74011250637 HC RX REV CODE- 250/637: Performed by: INTERNAL MEDICINE

## 2017-08-06 PROCEDURE — 99218 HC RM OBSERVATION: CPT

## 2017-08-06 PROCEDURE — 36415 COLL VENOUS BLD VENIPUNCTURE: CPT | Performed by: INTERNAL MEDICINE

## 2017-08-06 PROCEDURE — HZ2ZZZZ DETOXIFICATION SERVICES FOR SUBSTANCE ABUSE TREATMENT: ICD-10-PCS | Performed by: INTERNAL MEDICINE

## 2017-08-06 PROCEDURE — 85025 COMPLETE CBC W/AUTO DIFF WBC: CPT | Performed by: INTERNAL MEDICINE

## 2017-08-06 PROCEDURE — 80053 COMPREHEN METABOLIC PANEL: CPT | Performed by: INTERNAL MEDICINE

## 2017-08-06 RX ORDER — LANOLIN ALCOHOL/MO/W.PET/CERES
100 CREAM (GRAM) TOPICAL DAILY
Qty: 30 TAB | Refills: 0 | Status: SHIPPED | OUTPATIENT
Start: 2017-08-06 | End: 2017-09-05

## 2017-08-06 RX ORDER — FAMOTIDINE 20 MG/1
20 TABLET, FILM COATED ORAL 2 TIMES DAILY
Qty: 14 TAB | Refills: 0 | Status: SHIPPED | OUTPATIENT
Start: 2017-08-06 | End: 2017-08-13

## 2017-08-06 RX ORDER — FOLIC ACID 1 MG/1
1 TABLET ORAL DAILY
Qty: 30 TAB | Refills: 0 | Status: SHIPPED | OUTPATIENT
Start: 2017-08-06 | End: 2017-09-05

## 2017-08-06 RX ADMIN — SODIUM CHLORIDE 100 ML/HR: 900 INJECTION, SOLUTION INTRAVENOUS at 06:45

## 2017-08-06 RX ADMIN — ENOXAPARIN SODIUM 40 MG: 40 INJECTION SUBCUTANEOUS at 03:20

## 2017-08-06 RX ADMIN — Medication 1 TABLET: at 08:45

## 2017-08-06 RX ADMIN — HYDROCODONE BITARTRATE AND ACETAMINOPHEN 1 TABLET: 5; 325 TABLET ORAL at 03:14

## 2017-08-06 RX ADMIN — HYDROCODONE BITARTRATE AND ACETAMINOPHEN 1 TABLET: 5; 325 TABLET ORAL at 10:54

## 2017-08-06 RX ADMIN — Medication 100 MG: at 08:45

## 2017-08-06 RX ADMIN — SODIUM CHLORIDE 1000 ML: 900 INJECTION, SOLUTION INTRAVENOUS at 10:54

## 2017-08-06 RX ADMIN — HYDROCODONE BITARTRATE AND ACETAMINOPHEN 1 TABLET: 5; 325 TABLET ORAL at 06:43

## 2017-08-06 RX ADMIN — PROCHLORPERAZINE MALEATE 10 MG: 5 TABLET, FILM COATED ORAL at 03:17

## 2017-08-06 RX ADMIN — ONDANSETRON 4 MG: 2 INJECTION INTRAMUSCULAR; INTRAVENOUS at 08:51

## 2017-08-06 RX ADMIN — FAMOTIDINE 20 MG: 10 INJECTION INTRAVENOUS at 08:45

## 2017-08-06 RX ADMIN — TOPIRAMATE 50 MG: 25 TABLET, FILM COATED ORAL at 08:45

## 2017-08-06 RX ADMIN — FOLIC ACID 1 MG: 1 TABLET ORAL at 08:45

## 2017-08-06 NOTE — PROGRESS NOTES
Pt.'s family transported pt home. 5th floor  had given pt resources on AA and [address]  Pt has [employer]   PCP is Dr Jaz Robertson.   Joslyn Levi

## 2017-08-06 NOTE — PROGRESS NOTES
Patient discharged per MD order. Discharge instructions and prescriptions reviewed with patient. Patient verbalized understanding. IV removed with tip intact. Opportunity to ask questions were provided. Patient discharged home via with family refused wheelchair.

## 2017-08-06 NOTE — PROGRESS NOTES
Comment: Feeling considerably  Better this AM, and abdominal pain is less. Vomited only once. No evidence of withdrawal phenomena. Tolerating regular diet. Patient has delcared herself ready to go home today. Clinical data reviewed, patient examined. Bolused 1 L NS for low SBP. Discharged in satisfactory condition. For details, see Discharge Summary.

## 2017-08-06 NOTE — DISCHARGE SUMMARY
Hospitalist Discharge Summary     Patient ID:  Lida Franks  [id number]  43 y.o.  1985    PCP on record: Palak Espinoza MD    Admit date: 8/3/2017  Discharge date and time: 8/6/2017      DISCHARGE DIAGNOSIS:    1. Chronic ETOH abuse/Acute ETOH intoxication  2. ETOH withdrawal.  3. Elevated Lipase/possible ETOH gastritis.    4. Tobacco abuse.    5. Anxiety/Depression/Bipolar. CONSULTATIONS:  IP CONSULT TO HOSPITALIST  IP CONSULT TO PSYCHIATRY    Excerpted HPI from H&P of Merari Mohan DO:  Ms. Cary is a 28 y.o.   female, with a hx of EtOH pancreatitis, bipolar disorder, depression, ETOH abuse, who presented to the ED with epigastric pain, nausea, vomiting and acute alcohol binge x 3 days. Patient's late daughter's birthday was 2 days prior, and patient has been continuously drinking since then, supposedly, as a form of coping. Patient was seen in ED, noticeably intoxicated, poorly redirectable, drunk/belligerent at times. Admitted for further management.       ______________________________________________________________________  DISCHARGE SUMMARY/HOSPITAL COURSE:  for full details see H&P, daily progress notes, labs, consult notes. 1. Chronic ETOH abuse/Acute ETOH intoxication: ETOH level 380 on admission. Acutely intoxicated and acting out in ED, so given Ativan in ED. Managed with IV fluids/Vitamin supplements. As of 8/5/17, acute intoxication had resolved. Counseled appropriately.       2. ETOH withdrawal: Patient was placed on CIWA protocol, as deemed at high risk for withdrawal. Somewhat tremoulous in AM 8/5/17, but over the course of the day, this had resolved.       3. Elevated Lipase/possible ETOH gastritis: Lipase 565 at presentation. CT-negative for pancreatic inflammation. Patient had benign abdominal exam, although she complained of upper abdominal pain, and had some episodes of retching/vomiting. Managed supportively, with IV fluids.  H2RA, antiemetics. It is possible that she may have an element of mild, chronic pancreatitis, due to ETOH. Lipase levels were followed, trended down, and as of 8/6/17, had normalized at 266.       4. Tobacco abuse: Smokes 2 PPD. Counseled/managed with Nicotine patch      5. Anxiety/Depression/Bipolar: Stable on preadmission psychotropics.        Disposition: Clinically stable for discharge on 8/6/17.         _______________________________________________________________________  Patient seen and examined by me on discharge day. Pertinent Findings:  Gen:    Not in distress  Chest: Clear lungs  CVS:   Regular rhythm. No edema  Abd:  Soft, not distended, not tender  Neuro:  Alert, no focal deficit.  _______________________________________________________________________  DISCHARGE MEDICATIONS:   Current Discharge Medication List      START taking these medications    Details   folic acid (FOLVITE) 1 mg tablet Take 1 Tab by mouth daily for 30 days. Qty: 30 Tab, Refills: 0      thiamine (B-1) 100 mg tablet Take 1 Tab by mouth daily for 30 days. Qty: 30 Tab, Refills: 0      famotidine (PEPCID AC) 20 mg tablet Take 1 Tab by mouth two (2) times a day for 7 days. Qty: 14 Tab, Refills: 0         CONTINUE these medications which have NOT CHANGED    Details   traZODone (DESYREL) 50 mg tablet Take 150 mg by mouth nightly. topiramate (TOPAMAX) 50 mg tablet Take 50 mg by mouth nightly. HYDROcodone-acetaminophen (NORCO) 5-325 mg per tablet Take 1 Tab by mouth every eight (8) hours as needed for Pain. EPINEPHrine (EPIPEN) 0.3 mg/0.3 mL injection 0.3 mg by IntraMUSCular route once as needed for Allergic Response. QUEtiapine (SEROQUEL) 50 mg tablet Take 1 Tab by mouth three (3) times daily. Qty: 30 Tab, Refills: 0      ondansetron (ZOFRAN ODT) 4 mg disintegrating tablet Take 1 Tab by mouth every eight (8) hours as needed for Nausea.   Qty: 20 Tab, Refills: 0      albuterol (VENTOLIN HFA) 90 mcg/actuation inhaler Take 2 Puffs by inhalation every four (4) hours as needed for Wheezing. gabapentin (NEURONTIN) 300 mg capsule Take 300 mg by mouth daily. My Recommended Diet, Activity, Wound Care, and follow-up labs are listed in the patient's Discharge Insturctions which I have personally completed and reviewed.     _______________________________________________________________________  DISPOSITION:    Home with Family: Y   Home with HH/PT/OT/RN:    SNF/LTC:    [facility]:    OTHER:        Condition at Discharge:  Stable  _______________________________________________________________________  Follow up with:   PCP : Fabiana Taylor MD  Follow-up Information     Follow up With Details Comments [address] [facility] MD Navin In 1 week  [address]  [phone]      Follow up with your psychiatrist and psychologist In 1 week                Total time in minutes spent coordinating this discharge (includes going over instructions, follow-up, prescriptions, and preparing report for sign off to her PCP) :  <30 minutes    Signed:  Renetta Gipson MD

## 2017-08-06 NOTE — ROUTINE PROCESS
Bedside and Verbal shift change report given to Frances Serna RN (oncoming nurse) by Leopoldo Balder, RN (offgoing nurse). Report included the following information SBAR, Kardex, Procedure Summary, Intake/Output, MAR, Accordion, Recent Results and Med Rec Status.
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
## 2018-10-12 ENCOUNTER — HOSPITAL ENCOUNTER (EMERGENCY)
Age: 33
Discharge: HOME | End: 2018-10-12
Attending: EMERGENCY MEDICINE
Payer: MEDICAID

## 2018-10-12 VITALS
DIASTOLIC BLOOD PRESSURE: 95 MMHG | OXYGEN SATURATION: 95 % | TEMPERATURE: 97.6 F | HEIGHT: 67 IN | SYSTOLIC BLOOD PRESSURE: 120 MMHG | HEART RATE: 90 BPM | RESPIRATION RATE: 20 BRPM
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
## 2018-10-12 LAB
ALBUMIN SERPL-MCNC: 3.2 G/DL (ref 3.5–5)
ALBUMIN/GLOB SERPL: 0.6 {RATIO} (ref 1.1–2.2)
ALP SERPL-CCNC: 81 U/L (ref 45–117)
ALT SERPL-CCNC: 44 U/L (ref 12–78)
AMPHET UR QL SCN: NEGATIVE
ANION GAP SERPL CALC-SCNC: 9 MMOL/L (ref 5–15)
APPEARANCE UR: CLEAR
AST SERPL-CCNC: 112 U/L (ref 15–37)
BACTERIA URNS QL MICRO: NEGATIVE /HPF
BARBITURATES UR QL SCN: NEGATIVE
BASOPHILS # BLD: 0 K/UL (ref 0–0.1)
BASOPHILS NFR BLD: 0 % (ref 0–1)
BENZODIAZ UR QL: NEGATIVE
BILIRUB SERPL-MCNC: 0.2 MG/DL (ref 0.2–1)
BILIRUB UR QL: NEGATIVE
BUN SERPL-MCNC: 4 MG/DL (ref 6–20)
BUN/CREAT SERPL: 6 (ref 12–20)
CALCIUM SERPL-MCNC: 7.9 MG/DL (ref 8.5–10.1)
CANNABINOIDS UR QL SCN: NEGATIVE
CHLORIDE SERPL-SCNC: 102 MMOL/L (ref 97–108)
CO2 SERPL-SCNC: 25 MMOL/L (ref 21–32)
COCAINE UR QL SCN: NEGATIVE
COLOR UR: ABNORMAL
CREAT SERPL-MCNC: 0.64 MG/DL (ref 0.55–1.02)
DIFFERENTIAL METHOD BLD: ABNORMAL
DRUG SCRN COMMENT,DRGCM: NORMAL
EOSINOPHIL # BLD: 0 K/UL (ref 0–0.4)
EOSINOPHIL NFR BLD: 1 % (ref 0–7)
EPITH CASTS URNS QL MICRO: ABNORMAL /LPF
ERYTHROCYTE [DISTWIDTH] IN BLOOD BY AUTOMATED COUNT: 19.1 % (ref 11.5–14.5)
ETHANOL SERPL-MCNC: 480 MG/DL
GLOBULIN SER CALC-MCNC: 5 G/DL (ref 2–4)
GLUCOSE SERPL-MCNC: 98 MG/DL (ref 65–100)
GLUCOSE UR STRIP.AUTO-MCNC: NEGATIVE MG/DL
HCT VFR BLD AUTO: 38.9 % (ref 35–47)
HGB BLD-MCNC: 12.9 G/DL (ref 11.5–16)
HGB UR QL STRIP: NEGATIVE
IMM GRANULOCYTES # BLD: 0 K/UL (ref 0–0.04)
IMM GRANULOCYTES NFR BLD AUTO: 0 % (ref 0–0.5)
KETONES UR QL STRIP.AUTO: NEGATIVE MG/DL
LEUKOCYTE ESTERASE UR QL STRIP.AUTO: ABNORMAL
LIPASE SERPL-CCNC: 1043 U/L (ref 73–393)
LYMPHOCYTES # BLD: 1.6 K/UL (ref 0.8–3.5)
LYMPHOCYTES NFR BLD: 30 % (ref 12–49)
MCH RBC QN AUTO: 31.5 PG (ref 26–34)
MCHC RBC AUTO-ENTMCNC: 33.2 G/DL (ref 30–36.5)
MCV RBC AUTO: 94.9 FL (ref 80–99)
METHADONE UR QL: NEGATIVE
MONOCYTES # BLD: 0.5 K/UL (ref 0–1)
MONOCYTES NFR BLD: 9 % (ref 5–13)
NEUTS SEG # BLD: 3.3 K/UL (ref 1.8–8)
NEUTS SEG NFR BLD: 60 % (ref 32–75)
NITRITE UR QL STRIP.AUTO: NEGATIVE
NRBC # BLD: 0 K/UL (ref 0–0.01)
NRBC BLD-RTO: 0 PER 100 WBC
OPIATES UR QL: NEGATIVE
PCP UR QL: NEGATIVE
PH UR STRIP: 6.5 [PH] (ref 5–8)
PLATELET # BLD AUTO: 195 K/UL (ref 150–400)
PMV BLD AUTO: 12.6 FL (ref 8.9–12.9)
POTASSIUM SERPL-SCNC: 5.2 MMOL/L (ref 3.5–5.1)
PROT SERPL-MCNC: 8.2 G/DL (ref 6.4–8.2)
PROT UR STRIP-MCNC: NEGATIVE MG/DL
RBC # BLD AUTO: 4.1 M/UL (ref 3.8–5.2)
RBC #/AREA URNS HPF: ABNORMAL /HPF (ref 0–5)
SODIUM SERPL-SCNC: 136 MMOL/L (ref 136–145)
SP GR UR REFRACTOMETRY: 1.01 (ref 1–1.03)
UR CULT HOLD, URHOLD: NORMAL
UROBILINOGEN UR QL STRIP.AUTO: 0.2 EU/DL (ref 0.2–1)
WBC # BLD AUTO: 5.4 K/UL (ref 3.6–11)
WBC URNS QL MICRO: ABNORMAL /HPF (ref 0–4)

## 2018-10-12 PROCEDURE — 81001 URINALYSIS AUTO W/SCOPE: CPT | Performed by: EMERGENCY MEDICINE

## 2018-10-12 PROCEDURE — 74011000250 HC RX REV CODE- 250: Performed by: NURSE PRACTITIONER

## 2018-10-12 PROCEDURE — 83690 ASSAY OF LIPASE: CPT | Performed by: EMERGENCY MEDICINE

## 2018-10-12 PROCEDURE — 74011250636 HC RX REV CODE- 250/636: Performed by: NURSE PRACTITIONER

## 2018-10-12 PROCEDURE — 80307 DRUG TEST PRSMV CHEM ANLYZR: CPT | Performed by: EMERGENCY MEDICINE

## 2018-10-12 PROCEDURE — 80053 COMPREHEN METABOLIC PANEL: CPT | Performed by: EMERGENCY MEDICINE

## 2018-10-12 PROCEDURE — 96366 THER/PROPH/DIAG IV INF ADDON: CPT

## 2018-10-12 PROCEDURE — 80201 ASSAY OF TOPIRAMATE: CPT | Performed by: NURSE PRACTITIONER

## 2018-10-12 PROCEDURE — 85025 COMPLETE CBC W/AUTO DIFF WBC: CPT | Performed by: EMERGENCY MEDICINE

## 2018-10-12 PROCEDURE — 96365 THER/PROPH/DIAG IV INF INIT: CPT

## 2018-10-12 PROCEDURE — 99283 EMERGENCY DEPT VISIT LOW MDM: CPT

## 2018-10-12 PROCEDURE — 36415 COLL VENOUS BLD VENIPUNCTURE: CPT | Performed by: NURSE PRACTITIONER

## 2018-10-12 RX ORDER — SODIUM CHLORIDE 0.9 % (FLUSH) 0.9 %
5-10 SYRINGE (ML) INJECTION AS NEEDED
Status: DISCONTINUED | OUTPATIENT
Start: 2018-10-12 | End: 2018-10-12 | Stop reason: HOSPADM

## 2018-10-12 RX ORDER — LORAZEPAM 2 MG/ML
1 INJECTION INTRAMUSCULAR ONCE
Status: DISCONTINUED | OUTPATIENT
Start: 2018-10-12 | End: 2018-10-12

## 2018-10-12 RX ADMIN — FOLIC ACID: 5 INJECTION, SOLUTION INTRAMUSCULAR; INTRAVENOUS; SUBCUTANEOUS at 13:30
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
## 2018-10-12 NOTE — ED TRIAGE NOTES
Triage Note: Patient is coming in for detox from alcohol. Last drink was about 30 minutes ago. Patient is also having pain to the abdomen and back and has history of pancreatitis. Patient has been through detox recently.  is requesting recovery program for patient.

## 2018-10-12 NOTE — DISCHARGE INSTRUCTIONS
We hope that we have addressed all of your medical concerns. The examination and treatment you received in the Emergency Department were for an emergent problem and were not intended as complete care. It is important that you follow up with your healthcare provider(s) for ongoing care. If your symptoms worsen or do not improve as expected, and you are unable to reach your usual health care provider(s), you should return to the Emergency Department. Today's healthcare is undergoing tremendous change, and patient satisfaction surveys are one of the many tools to assess the quality of medical care. You may receive a survey from the [employer] regarding your experience in the Emergency Department. I hope that your experience has been completely positive, particularly the medical care that I provided. As such, please participate in the survey; anything less than excellent does not meet my expectations or intentions. [facility] and [address] [place] [address] participate in nationally recognized quality of care measures. If your blood pressure is greater than 120/80, as reported below, we urge that you seek medical care to address the potential of high blood pressure, commonly known as hypertension. Hypertension can be hereditary or can be caused by certain medical conditions, pain, stress, or \"white coat syndrome. \"       Please make an appointment with your health care provider(s) for follow up of your Emergency Department visit. VITALS:   Patient Vitals for the past 8 hrs:   Temp Pulse Resp BP SpO2   10/12/18 1216 97.6 °F (36.4 °C) 90 20 (!) 120/95 95 %          Thank you for allowing us to provide you with medical care today. We realize that you have many choices for your emergency care needs. Please choose us in the future for any continued health care needs. Nader Zamudio, [address] [facility]. Office: [phone]            Recent Results (from the past 24 hour(s))   ETHYL ALCOHOL    Collection Time: 10/12/18  1:25 PM   Result Value Ref Range    ALCOHOL(ETHYL),SERUM 480 (H) <10 MG/DL   CBC WITH AUTOMATED DIFF    Collection Time: 10/12/18  1:25 PM   Result Value Ref Range    WBC 5.4 3.6 - 11.0 K/uL    RBC 4.10 3.80 - 5.20 M/uL    HGB 12.9 11.5 - 16.0 g/dL    HCT 38.9 35.0 - 47.0 %    MCV 94.9 80.0 - 99.0 FL    MCH 31.5 26.0 - 34.0 PG    MCHC 33.2 30.0 - 36.5 g/dL    RDW 19.1 (H) 11.5 - 14.5 %    PLATELET 345 804 - 387 K/uL    MPV 12.6 8.9 - 12.9 FL    NRBC 0.0 0  WBC    ABSOLUTE NRBC 0.00 0.00 - 0.01 K/uL    NEUTROPHILS 60 32 - 75 %    LYMPHOCYTES 30 12 - 49 %    MONOCYTES 9 5 - 13 %    EOSINOPHILS 1 0 - 7 %    BASOPHILS 0 0 - 1 %    IMMATURE GRANULOCYTES 0 0.0 - 0.5 %    ABS. NEUTROPHILS 3.3 1.8 - 8.0 K/UL    ABS. LYMPHOCYTES 1.6 0.8 - 3.5 K/UL    ABS. MONOCYTES 0.5 0.0 - 1.0 K/UL    ABS. EOSINOPHILS 0.0 0.0 - 0.4 K/UL    ABS. BASOPHILS 0.0 0.0 - 0.1 K/UL    ABS. IMM. GRANS. 0.0 0.00 - 0.04 K/UL    DF AUTOMATED     METABOLIC PANEL, COMPREHENSIVE    Collection Time: 10/12/18  1:25 PM   Result Value Ref Range    Sodium 136 136 - 145 mmol/L    Potassium 5.2 (H) 3.5 - 5.1 mmol/L    Chloride 102 97 - 108 mmol/L    CO2 25 21 - 32 mmol/L    Anion gap 9 5 - 15 mmol/L    Glucose 98 65 - 100 mg/dL    BUN 4 (L) 6 - 20 MG/DL    Creatinine 0.64 0.55 - 1.02 MG/DL    BUN/Creatinine ratio 6 (L) 12 - 20      GFR est AA >60 >60 ml/min/1.73m2    GFR est non-AA >60 >60 ml/min/1.73m2    Calcium 7.9 (L) 8.5 - 10.1 MG/DL    Bilirubin, total 0.2 0.2 - 1.0 MG/DL    ALT (SGPT) 44 12 - 78 U/L    AST (SGOT) 112 (H) 15 - 37 U/L    Alk.  phosphatase 81 45 - 117 U/L    Protein, total 8.2 6.4 - 8.2 g/dL    Albumin 3.2 (L) 3.5 - 5.0 g/dL    Globulin 5.0 (H) 2.0 - 4.0 g/dL    A-G Ratio 0.6 (L) 1.1 - 2.2     URINALYSIS W/MICROSCOPIC    Collection Time: 10/12/18  1:25 PM   Result Value Ref Range    Color YELLOW/STRAW Appearance CLEAR CLEAR      Specific gravity 1.006 1.003 - 1.030      pH (UA) 6.5 5.0 - 8.0      Protein NEGATIVE  NEG mg/dL    Glucose NEGATIVE  NEG mg/dL    Ketone NEGATIVE  NEG mg/dL    Bilirubin NEGATIVE  NEG      Blood NEGATIVE  NEG      Urobilinogen 0.2 0.2 - 1.0 EU/dL    Nitrites NEGATIVE  NEG      Leukocyte Esterase SMALL (A) NEG      WBC 5-10 0 - 4 /hpf    RBC 0-5 0 - 5 /hpf    Epithelial cells FEW FEW /lpf    Bacteria NEGATIVE  NEG /hpf   URINE CULTURE HOLD SAMPLE    Collection Time: 10/12/18  1:25 PM   Result Value Ref Range    Urine culture hold        URINE ON HOLD IN MICROBIOLOGY DEPT FOR 3 DAYS. IF UNPRESERVED URINE IS SUBMITTED, IT CANNOT BE USED FOR ADDITIONAL TESTING AFTER 24 HRS, RECOLLECTION WILL BE REQUIRED.    DRUG SCREEN, URINE    Collection Time: 10/12/18  1:25 PM   Result Value Ref Range    AMPHETAMINES NEGATIVE  NEG      BARBITURATES NEGATIVE  NEG      BENZODIAZEPINES NEGATIVE  NEG      COCAINE NEGATIVE  NEG      METHADONE NEGATIVE  NEG      OPIATES NEGATIVE  NEG      PCP(PHENCYCLIDINE) NEGATIVE  NEG      THC (TH-CANNABINOL) NEGATIVE  NEG      Drug screen comment (NOTE)    LIPASE    Collection Time: 10/12/18  1:25 PM   Result Value Ref Range    Lipase 1043 (H) 73 - 393 U/L       No results found.  '

## 2018-10-12 NOTE — ED NOTES
Patient refusing to stay on monitor, wandering around unit and into other patients room, stating she is going to have seizures. No seizure activity noted.
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
## 2018-10-15 LAB — TOPIRAMATE SERPL-MCNC: NORMAL UG/ML (ref 2–25)

## 2020-12-02 RX ORDER — PANCRELIPASE 36000; 180000; 114000 [USP'U]/1; [USP'U]/1; [USP'U]/1
CAPSULE, DELAYED RELEASE PELLETS ORAL
COMMUNITY

## 2020-12-02 RX ORDER — SUCRALFATE 1 G/1
1 TABLET ORAL 4 TIMES DAILY
COMMUNITY

## 2020-12-02 RX ORDER — FLUOXETINE HYDROCHLORIDE 40 MG/1
40 CAPSULE ORAL DAILY
COMMUNITY

## 2020-12-02 RX ORDER — PROCHLORPERAZINE MALEATE 10 MG
5 TABLET ORAL
COMMUNITY

## 2020-12-02 RX ORDER — METOCLOPRAMIDE 10 MG/1
10 TABLET ORAL
COMMUNITY

## 2020-12-02 RX ORDER — PANTOPRAZOLE SODIUM 40 MG/1
40 TABLET, DELAYED RELEASE ORAL DAILY
COMMUNITY

## 2020-12-02 RX ORDER — PRAZOSIN HYDROCHLORIDE 1 MG/1
1 CAPSULE ORAL
COMMUNITY

## 2020-12-02 RX ORDER — HYDROCODONE BITARTRATE AND ACETAMINOPHEN 7.5; 3 MG/1; MG/1
1 TABLET ORAL
COMMUNITY

## 2020-12-07 ENCOUNTER — HOSPITAL ENCOUNTER (OUTPATIENT)
Dept: PREADMISSION TESTING | Age: 35
Discharge: HOME | End: 2020-12-07
Payer: MEDICAID

## 2020-12-07 LAB — SARS-COV-2, COV2: NORMAL

## 2020-12-07 PROCEDURE — 87635 SARS-COV-2 COVID-19 AMP PRB: CPT

## 2020-12-08 LAB — SARS-COV-2, COV2NT: NOT DETECTED

## 2020-12-11 ENCOUNTER — ANESTHESIA EVENT (OUTPATIENT)
Dept: ENDOSCOPY | Age: 35
End: 2020-12-11
Payer: MEDICAID

## 2020-12-11 ENCOUNTER — HOSPITAL ENCOUNTER (OUTPATIENT)
Age: 35
Setting detail: OUTPATIENT SURGERY
Discharge: HOME | End: 2020-12-11
Attending: INTERNAL MEDICINE | Admitting: INTERNAL MEDICINE
Payer: MEDICAID

## 2020-12-11 ENCOUNTER — APPOINTMENT (OUTPATIENT)
Dept: ENDOSCOPY | Age: 35
End: 2020-12-11
Attending: INTERNAL MEDICINE
Payer: MEDICAID

## 2020-12-11 ENCOUNTER — ANESTHESIA (OUTPATIENT)
Dept: ENDOSCOPY | Age: 35
End: 2020-12-11
Payer: MEDICAID

## 2020-12-11 VITALS
HEART RATE: 70 BPM | OXYGEN SATURATION: 98 % | HEIGHT: 66 IN | BODY MASS INDEX: 25.71 KG/M2 | TEMPERATURE: 97.2 F | RESPIRATION RATE: 18 BRPM | DIASTOLIC BLOOD PRESSURE: 69 MMHG | WEIGHT: 160 LBS | SYSTOLIC BLOOD PRESSURE: 100 MMHG

## 2020-12-11 PROCEDURE — 74011250636 HC RX REV CODE- 250/636: Performed by: NURSE ANESTHETIST, CERTIFIED REGISTERED

## 2020-12-11 PROCEDURE — 76040000007: Performed by: INTERNAL MEDICINE

## 2020-12-11 PROCEDURE — 76060000032 HC ANESTHESIA 0.5 TO 1 HR: Performed by: INTERNAL MEDICINE

## 2020-12-11 PROCEDURE — 2709999900 HC NON-CHARGEABLE SUPPLY: Performed by: INTERNAL MEDICINE

## 2020-12-11 PROCEDURE — 74011000250 HC RX REV CODE- 250: Performed by: NURSE ANESTHETIST, CERTIFIED REGISTERED

## 2020-12-11 RX ORDER — PROPOFOL 10 MG/ML
INJECTION, EMULSION INTRAVENOUS AS NEEDED
Status: DISCONTINUED | OUTPATIENT
Start: 2020-12-11 | End: 2020-12-11 | Stop reason: HOSPADM

## 2020-12-11 RX ORDER — SODIUM CHLORIDE, SODIUM LACTATE, POTASSIUM CHLORIDE, CALCIUM CHLORIDE 600; 310; 30; 20 MG/100ML; MG/100ML; MG/100ML; MG/100ML
INJECTION, SOLUTION INTRAVENOUS
Status: DISCONTINUED | OUTPATIENT
Start: 2020-12-11 | End: 2020-12-11 | Stop reason: HOSPADM

## 2020-12-11 RX ORDER — LIDOCAINE HYDROCHLORIDE 20 MG/ML
INJECTION, SOLUTION EPIDURAL; INFILTRATION; INTRACAUDAL; PERINEURAL
Status: COMPLETED
Start: 2020-12-11 | End: 2020-12-11

## 2020-12-11 RX ORDER — MIDAZOLAM HYDROCHLORIDE 1 MG/ML
INJECTION, SOLUTION INTRAMUSCULAR; INTRAVENOUS AS NEEDED
Status: DISCONTINUED | OUTPATIENT
Start: 2020-12-11 | End: 2020-12-11 | Stop reason: HOSPADM

## 2020-12-11 RX ORDER — ONDANSETRON 2 MG/ML
INJECTION INTRAMUSCULAR; INTRAVENOUS
Status: COMPLETED
Start: 2020-12-11 | End: 2020-12-11

## 2020-12-11 RX ORDER — GLYCOPYRROLATE 0.2 MG/ML
INJECTION INTRAMUSCULAR; INTRAVENOUS
Status: COMPLETED
Start: 2020-12-11 | End: 2020-12-11

## 2020-12-11 RX ORDER — ROCURONIUM BROMIDE 10 MG/ML
INJECTION, SOLUTION INTRAVENOUS
Status: COMPLETED
Start: 2020-12-11 | End: 2020-12-11

## 2020-12-11 RX ORDER — DEXAMETHASONE SODIUM PHOSPHATE 4 MG/ML
INJECTION, SOLUTION INTRA-ARTICULAR; INTRALESIONAL; INTRAMUSCULAR; INTRAVENOUS; SOFT TISSUE
Status: DISCONTINUED
Start: 2020-12-11 | End: 2020-12-12 | Stop reason: HOSPADM

## 2020-12-11 RX ORDER — ROCURONIUM BROMIDE 10 MG/ML
INJECTION, SOLUTION INTRAVENOUS AS NEEDED
Status: DISCONTINUED | OUTPATIENT
Start: 2020-12-11 | End: 2020-12-11 | Stop reason: HOSPADM

## 2020-12-11 RX ORDER — GLYCOPYRROLATE 0.2 MG/ML
INJECTION INTRAMUSCULAR; INTRAVENOUS AS NEEDED
Status: DISCONTINUED | OUTPATIENT
Start: 2020-12-11 | End: 2020-12-11 | Stop reason: HOSPADM

## 2020-12-11 RX ORDER — ONDANSETRON 2 MG/ML
INJECTION INTRAMUSCULAR; INTRAVENOUS AS NEEDED
Status: DISCONTINUED | OUTPATIENT
Start: 2020-12-11 | End: 2020-12-11 | Stop reason: HOSPADM

## 2020-12-11 RX ORDER — GLYCOPYRROLATE 0.2 MG/ML
INJECTION INTRAMUSCULAR; INTRAVENOUS
Status: DISCONTINUED
Start: 2020-12-11 | End: 2020-12-12 | Stop reason: HOSPADM

## 2020-12-11 RX ORDER — DEXAMETHASONE SODIUM PHOSPHATE 4 MG/ML
INJECTION, SOLUTION INTRA-ARTICULAR; INTRALESIONAL; INTRAMUSCULAR; INTRAVENOUS; SOFT TISSUE
Status: COMPLETED
Start: 2020-12-11 | End: 2020-12-11

## 2020-12-11 RX ORDER — LIDOCAINE HYDROCHLORIDE 20 MG/ML
INJECTION, SOLUTION EPIDURAL; INFILTRATION; INTRACAUDAL; PERINEURAL AS NEEDED
Status: DISCONTINUED | OUTPATIENT
Start: 2020-12-11 | End: 2020-12-11 | Stop reason: HOSPADM

## 2020-12-11 RX ORDER — SODIUM CHLORIDE, SODIUM LACTATE, POTASSIUM CHLORIDE, CALCIUM CHLORIDE 600; 310; 30; 20 MG/100ML; MG/100ML; MG/100ML; MG/100ML
50 INJECTION, SOLUTION INTRAVENOUS CONTINUOUS
Status: DISCONTINUED | OUTPATIENT
Start: 2020-12-11 | End: 2020-12-14 | Stop reason: HOSPADM

## 2020-12-11 RX ORDER — MIDAZOLAM HYDROCHLORIDE 1 MG/ML
INJECTION, SOLUTION INTRAMUSCULAR; INTRAVENOUS
Status: DISCONTINUED
Start: 2020-12-11 | End: 2020-12-12 | Stop reason: HOSPADM

## 2020-12-11 RX ORDER — DEXAMETHASONE SODIUM PHOSPHATE 4 MG/ML
INJECTION, SOLUTION INTRA-ARTICULAR; INTRALESIONAL; INTRAMUSCULAR; INTRAVENOUS; SOFT TISSUE AS NEEDED
Status: DISCONTINUED | OUTPATIENT
Start: 2020-12-11 | End: 2020-12-11 | Stop reason: HOSPADM

## 2020-12-11 RX ORDER — NEOSTIGMINE METHYLSULFATE 1 MG/ML
INJECTION INTRAVENOUS AS NEEDED
Status: DISCONTINUED | OUTPATIENT
Start: 2020-12-11 | End: 2020-12-11 | Stop reason: HOSPADM

## 2020-12-11 RX ORDER — DEXMEDETOMIDINE HYDROCHLORIDE 100 UG/ML
INJECTION, SOLUTION INTRAVENOUS AS NEEDED
Status: DISCONTINUED | OUTPATIENT
Start: 2020-12-11 | End: 2020-12-11 | Stop reason: HOSPADM

## 2020-12-11 RX ADMIN — GLYCOPYRROLATE 0.6 MG: 0.2 INJECTION, SOLUTION INTRAMUSCULAR; INTRAVENOUS at 14:08

## 2020-12-11 RX ADMIN — NEOSTIGMINE METHYLSULFATE 5 MG: 1 INJECTION INTRAVENOUS at 14:08

## 2020-12-11 RX ADMIN — MIDAZOLAM HYDROCHLORIDE 2 MG: 2 INJECTION, SOLUTION INTRAMUSCULAR; INTRAVENOUS at 13:39

## 2020-12-11 RX ADMIN — SODIUM CHLORIDE, POTASSIUM CHLORIDE, SODIUM LACTATE AND CALCIUM CHLORIDE: 600; 310; 30; 20 INJECTION, SOLUTION INTRAVENOUS at 13:38

## 2020-12-11 RX ADMIN — DEXMEDETOMIDINE HYDROCHLORIDE 5 MCG: 100 INJECTION, SOLUTION INTRAVENOUS at 13:53

## 2020-12-11 RX ADMIN — LIDOCAINE HYDROCHLORIDE 100 MG: 20 INJECTION, SOLUTION EPIDURAL; INFILTRATION; INTRACAUDAL; PERINEURAL at 13:44

## 2020-12-11 RX ADMIN — DEXMEDETOMIDINE HYDROCHLORIDE 5 MCG: 100 INJECTION, SOLUTION INTRAVENOUS at 13:45

## 2020-12-11 RX ADMIN — DEXAMETHASONE SODIUM PHOSPHATE 8 MG: 4 INJECTION, SOLUTION INTRA-ARTICULAR; INTRALESIONAL; INTRAMUSCULAR; INTRAVENOUS; SOFT TISSUE at 13:53

## 2020-12-11 RX ADMIN — ONDANSETRON 4 MG: 2 INJECTION INTRAMUSCULAR; INTRAVENOUS at 13:55

## 2020-12-11 RX ADMIN — PROPOFOL 200 MG: 10 INJECTION, EMULSION INTRAVENOUS at 13:47

## 2020-12-11 RX ADMIN — ROCURONIUM BROMIDE 35 MG: 10 SOLUTION INTRAVENOUS at 13:44

## 2020-12-11 NOTE — PERIOP NOTES
Pt to endo from PACU post procedure bedside report given to [facility] [address] [address]

## 2020-12-11 NOTE — ANESTHESIA PREPROCEDURE EVALUATION
Relevant Problems   No relevant active problems       Anesthetic History   No history of anesthetic complications            Review of Systems / Medical History  Patient summary reviewed, nursing notes reviewed and pertinent labs reviewed    Pulmonary          Smoker  Asthma        Neuro/Psych         Psychiatric history    Comments: Bipolar   Schizophrenia Cardiovascular  Within defined limits                     GI/Hepatic/Renal     GERD          Comments: Pancreatitis Endo/Other  Within defined limits           Other Findings   Comments: ETOH abuse           Physical Exam    Airway  Mallampati: II  TM Distance: 4 - 6 cm  Neck ROM: normal range of motion   Mouth opening: Normal     Cardiovascular    Rhythm: regular  Rate: normal         Dental    Dentition: Poor dentition     Pulmonary  Breath sounds clear to auscultation               Abdominal  GI exam deferred       Other Findings            Anesthetic Plan    ASA: 3  Anesthesia type: general and total IV anesthesia          Induction: Intravenous  Anesthetic plan and risks discussed with: Patient

## 2020-12-11 NOTE — ANESTHESIA POSTPROCEDURE EVALUATION
Procedure(s):  ENDOSCOPIC ULTRASOUND (EUS)  FINE NEEDLE ASPIRATION.     general, total IV anesthesia    Anesthesia Post Evaluation      Multimodal analgesia: multimodal analgesia used between 6 hours prior to anesthesia start to PACU discharge  Patient location during evaluation: PACU  Patient participation: complete - patient participated  Level of consciousness: awake  Pain score: 0  Pain management: adequate  Airway patency: patent  Anesthetic complications: no  Cardiovascular status: acceptable  Respiratory status: acceptable  Hydration status: acceptable  Post anesthesia nausea and vomiting:  controlled  Final Post Anesthesia Temperature Assessment:  Normothermia (36.0-37.5 degrees C)      INITIAL Post-op Vital signs:   Vitals Value Taken Time   BP 98/64 12/11/2020  2:48 PM   Temp 36.2 °C (97.2 °F) 12/11/2020  2:24 PM   Pulse 71 12/11/2020  2:48 PM   Resp 16 12/11/2020  2:48 PM   SpO2 97 % 12/11/2020  2:48 PM

## 2021-02-12 ENCOUNTER — HOSPITAL ENCOUNTER (EMERGENCY)
Age: 36
Discharge: HOME | End: 2021-02-12
Attending: EMERGENCY MEDICINE | Admitting: EMERGENCY MEDICINE
Payer: MEDICAID

## 2021-02-12 VITALS
WEIGHT: 160 LBS | HEIGHT: 66 IN | TEMPERATURE: 98.3 F | HEART RATE: 86 BPM | OXYGEN SATURATION: 99 % | RESPIRATION RATE: 18 BRPM | DIASTOLIC BLOOD PRESSURE: 74 MMHG | SYSTOLIC BLOOD PRESSURE: 103 MMHG | BODY MASS INDEX: 25.71 KG/M2

## 2021-02-12 DIAGNOSIS — K86.1 CHRONIC PANCREATITIS, UNSPECIFIED PANCREATITIS TYPE (HCC): Primary | ICD-10-CM

## 2021-02-12 LAB
ALBUMIN SERPL-MCNC: 3.5 G/DL (ref 3.5–5)
ALBUMIN/GLOB SERPL: 0.9 {RATIO} (ref 1.1–2.2)
ALP SERPL-CCNC: 66 U/L (ref 45–117)
ALT SERPL-CCNC: 25 U/L (ref 12–78)
ANION GAP SERPL CALC-SCNC: 4 MMOL/L (ref 5–15)
APPEARANCE UR: CLEAR
AST SERPL W P-5'-P-CCNC: 40 U/L (ref 15–37)
BACTERIA URNS QL MICRO: ABNORMAL /HPF
BASOPHILS # BLD: 0 K/UL (ref 0–0.1)
BASOPHILS NFR BLD: 0 % (ref 0–1)
BILIRUB SERPL-MCNC: 0.2 MG/DL (ref 0.2–1)
BILIRUB UR QL: NEGATIVE
BUN SERPL-MCNC: 5 MG/DL (ref 6–20)
BUN/CREAT SERPL: 6 (ref 12–20)
CA-I BLD-MCNC: 9.2 MG/DL (ref 8.5–10.1)
CHLORIDE SERPL-SCNC: 106 MMOL/L (ref 97–108)
CO2 SERPL-SCNC: 26 MMOL/L (ref 21–32)
COLOR UR: ABNORMAL
CREAT SERPL-MCNC: 0.79 MG/DL (ref 0.55–1.02)
DIFFERENTIAL METHOD BLD: ABNORMAL
EOSINOPHIL # BLD: 0.1 K/UL (ref 0–0.4)
EOSINOPHIL NFR BLD: 1 % (ref 0–7)
ERYTHROCYTE [DISTWIDTH] IN BLOOD BY AUTOMATED COUNT: 15.2 % (ref 11.5–14.5)
GLOBULIN SER CALC-MCNC: 4.1 G/DL (ref 2–4)
GLUCOSE SERPL-MCNC: 88 MG/DL (ref 65–100)
GLUCOSE UR STRIP.AUTO-MCNC: NEGATIVE MG/DL
HCG SERPL QL: NEGATIVE
HCT VFR BLD AUTO: 37.1 % (ref 35–47)
HGB BLD-MCNC: 12.2 G/DL (ref 11.5–16)
HGB UR QL STRIP: NEGATIVE
IMM GRANULOCYTES # BLD AUTO: 0 K/UL (ref 0–0.04)
IMM GRANULOCYTES NFR BLD AUTO: 0 % (ref 0–0.5)
KETONES UR QL STRIP.AUTO: NEGATIVE MG/DL
LEUKOCYTE ESTERASE UR QL STRIP.AUTO: NEGATIVE
LIPASE SERPL-CCNC: 177 U/L (ref 73–393)
LYMPHOCYTES # BLD: 2.2 K/UL (ref 0.8–3.5)
LYMPHOCYTES NFR BLD: 26 % (ref 12–49)
MCH RBC QN AUTO: 31 PG (ref 26–34)
MCHC RBC AUTO-ENTMCNC: 32.9 G/DL (ref 30–36.5)
MCV RBC AUTO: 94.2 FL (ref 80–99)
MONOCYTES # BLD: 0.6 K/UL (ref 0–1)
MONOCYTES NFR BLD: 7 % (ref 5–13)
MUCOUS THREADS URNS QL MICRO: ABNORMAL /LPF
NEUTS SEG # BLD: 5.8 K/UL (ref 1.8–8)
NEUTS SEG NFR BLD: 66 % (ref 32–75)
NITRITE UR QL STRIP.AUTO: NEGATIVE
PH UR STRIP: 6 [PH] (ref 5–8)
PLATELET # BLD AUTO: 264 K/UL (ref 150–400)
PMV BLD AUTO: 11.6 FL (ref 8.9–12.9)
POTASSIUM SERPL-SCNC: 5.2 MMOL/L (ref 3.5–5.1)
PROT SERPL-MCNC: 7.6 G/DL (ref 6.4–8.2)
PROT UR STRIP-MCNC: NEGATIVE MG/DL
RBC # BLD AUTO: 3.94 M/UL (ref 3.8–5.2)
RBC #/AREA URNS HPF: ABNORMAL /HPF (ref 0–5)
SODIUM SERPL-SCNC: 136 MMOL/L (ref 136–145)
SP GR UR REFRACTOMETRY: 1.01 (ref 1–1.03)
UA: UC IF INDICATED,UAUC: ABNORMAL
UROBILINOGEN UR QL STRIP.AUTO: 2 EU/DL (ref 0.1–1)
WBC # BLD AUTO: 8.7 K/UL (ref 3.6–11)
WBC URNS QL MICRO: ABNORMAL /HPF (ref 0–4)

## 2021-02-12 PROCEDURE — 83690 ASSAY OF LIPASE: CPT

## 2021-02-12 PROCEDURE — 85025 COMPLETE CBC W/AUTO DIFF WBC: CPT

## 2021-02-12 PROCEDURE — 74011250637 HC RX REV CODE- 250/637: Performed by: EMERGENCY MEDICINE

## 2021-02-12 PROCEDURE — 84703 CHORIONIC GONADOTROPIN ASSAY: CPT

## 2021-02-12 PROCEDURE — 80053 COMPREHEN METABOLIC PANEL: CPT

## 2021-02-12 PROCEDURE — 96375 TX/PRO/DX INJ NEW DRUG ADDON: CPT

## 2021-02-12 PROCEDURE — 99284 EMERGENCY DEPT VISIT MOD MDM: CPT

## 2021-02-12 PROCEDURE — 96374 THER/PROPH/DIAG INJ IV PUSH: CPT

## 2021-02-12 PROCEDURE — 81001 URINALYSIS AUTO W/SCOPE: CPT

## 2021-02-12 PROCEDURE — 96361 HYDRATE IV INFUSION ADD-ON: CPT

## 2021-02-12 PROCEDURE — 74011250636 HC RX REV CODE- 250/636: Performed by: EMERGENCY MEDICINE

## 2021-02-12 PROCEDURE — 36415 COLL VENOUS BLD VENIPUNCTURE: CPT

## 2021-02-12 RX ORDER — KETOROLAC TROMETHAMINE 30 MG/ML
30 INJECTION, SOLUTION INTRAMUSCULAR; INTRAVENOUS
Status: COMPLETED | OUTPATIENT
Start: 2021-02-12 | End: 2021-02-12

## 2021-02-12 RX ORDER — ONDANSETRON 2 MG/ML
4 INJECTION INTRAMUSCULAR; INTRAVENOUS
Status: DISCONTINUED | OUTPATIENT
Start: 2021-02-12 | End: 2021-02-12

## 2021-02-12 RX ORDER — FENTANYL CITRATE 50 UG/ML
50 INJECTION, SOLUTION INTRAMUSCULAR; INTRAVENOUS
Status: COMPLETED | OUTPATIENT
Start: 2021-02-12 | End: 2021-02-12

## 2021-02-12 RX ORDER — DIFLUNISAL 500 MG/1
500 TABLET, FILM COATED ORAL 2 TIMES DAILY
Qty: 20 TAB | Refills: 0 | Status: SHIPPED | OUTPATIENT
Start: 2021-02-12

## 2021-02-12 RX ORDER — METOCLOPRAMIDE HYDROCHLORIDE 5 MG/ML
10 INJECTION INTRAMUSCULAR; INTRAVENOUS
Status: COMPLETED | OUTPATIENT
Start: 2021-02-12 | End: 2021-02-12

## 2021-02-12 RX ORDER — HYDROCODONE BITARTRATE AND ACETAMINOPHEN 5; 325 MG/1; MG/1
2 TABLET ORAL
Status: COMPLETED | OUTPATIENT
Start: 2021-02-12 | End: 2021-02-12

## 2021-02-12 RX ADMIN — KETOROLAC TROMETHAMINE 30 MG: 30 INJECTION, SOLUTION INTRAMUSCULAR at 15:57

## 2021-02-12 RX ADMIN — HYDROCODONE BITARTRATE AND ACETAMINOPHEN 2 TABLET: 5; 325 TABLET ORAL at 17:22

## 2021-02-12 RX ADMIN — SODIUM CHLORIDE 1000 ML: 9 INJECTION, SOLUTION INTRAVENOUS at 15:57

## 2021-02-12 RX ADMIN — METOCLOPRAMIDE 10 MG: 5 INJECTION, SOLUTION INTRAMUSCULAR; INTRAVENOUS at 15:57

## 2021-02-12 RX ADMIN — FENTANYL CITRATE 50 MCG: 50 INJECTION INTRAMUSCULAR; INTRAVENOUS at 15:57
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
## 2021-02-12 NOTE — ED TRIAGE NOTES
Hx of pancreatitis, ate steak last night and felt funny, woke up still feeling unwell, ate breakfast and then started feeling worse. Reports nausea and diarrhea, denies vomiting.

## 2021-08-06 ENCOUNTER — HOSPITAL ENCOUNTER (EMERGENCY)
Age: 36
Discharge: HOME | End: 2021-08-06
Attending: STUDENT IN AN ORGANIZED HEALTH CARE EDUCATION/TRAINING PROGRAM
Payer: MEDICAID

## 2021-08-06 VITALS
RESPIRATION RATE: 16 BRPM | HEART RATE: 92 BPM | SYSTOLIC BLOOD PRESSURE: 132 MMHG | HEIGHT: 66 IN | WEIGHT: 185 LBS | BODY MASS INDEX: 29.73 KG/M2 | DIASTOLIC BLOOD PRESSURE: 94 MMHG | OXYGEN SATURATION: 94 % | TEMPERATURE: 98.7 F

## 2021-08-06 DIAGNOSIS — R10.9 CHRONIC ABDOMINAL PAIN: Primary | ICD-10-CM

## 2021-08-06 DIAGNOSIS — G89.29 CHRONIC ABDOMINAL PAIN: Primary | ICD-10-CM

## 2021-08-06 LAB
COMMENT, HOLDF: NORMAL
HCG UR QL: NEGATIVE
SAMPLES BEING HELD,HOLD: NORMAL

## 2021-08-06 PROCEDURE — 74011250637 HC RX REV CODE- 250/637: Performed by: STUDENT IN AN ORGANIZED HEALTH CARE EDUCATION/TRAINING PROGRAM

## 2021-08-06 PROCEDURE — 81025 URINE PREGNANCY TEST: CPT

## 2021-08-06 PROCEDURE — 96372 THER/PROPH/DIAG INJ SC/IM: CPT

## 2021-08-06 PROCEDURE — 74011250636 HC RX REV CODE- 250/636: Performed by: STUDENT IN AN ORGANIZED HEALTH CARE EDUCATION/TRAINING PROGRAM

## 2021-08-06 PROCEDURE — 99285 EMERGENCY DEPT VISIT HI MDM: CPT

## 2021-08-06 RX ORDER — DICYCLOMINE HYDROCHLORIDE 10 MG/ML
20 INJECTION INTRAMUSCULAR
Status: DISCONTINUED | OUTPATIENT
Start: 2021-08-06 | End: 2021-08-06 | Stop reason: HOSPADM

## 2021-08-06 RX ORDER — ACETAMINOPHEN 500 MG
1000 TABLET ORAL ONCE
Status: COMPLETED | OUTPATIENT
Start: 2021-08-06 | End: 2021-08-06

## 2021-08-06 RX ADMIN — DICYCLOMINE HYDROCHLORIDE 20 MG: 20 INJECTION, SOLUTION INTRAMUSCULAR at 06:23

## 2021-08-06 RX ADMIN — ACETAMINOPHEN 1000 MG: 500 TABLET ORAL at 06:22
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
## 2021-08-06 NOTE — ED TRIAGE NOTES
Presents to ED with complaints of abdominal pain which onset x1 day associated with mausea, vomiting and diarrhea. LMP 7/25/2021.  h/o Pancreatitis. Was discharged from [address] [place] [address] yesterday.

## 2022-02-17 NOTE — DISCHARGE SUMMARY
"   02/17/22 0908   General Information   Onset of Illness/Injury or Date of Surgery 02/16/22   Referring Physician Nellie Simmons   Patient/Family Therapy Goal Statement (SLP) eat/drink   Pertinent History of Current Problem \"Leydi Atkinson is a 65 year old female with past medical history significant for breast cancer and prior pulmonary embolism. admitted on 2/17/2022 with acute stroke. She reports that she initially started noticing some symptoms on Tuesday evening (2/15).  At that time she endorsed some faculties with speech and troubles with balance.  When her family went in to visit her today (2/16) they noted that her speech was significantly abnormal.  She also had a quite obvious left-sided facial droop. 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.\"   General Observations Pt alert, very pleasant; pt did not notice any changes in speech or facial droop, however, reported that her family noticed these changes   Past History of Dysphagia No reported or documented history   Type of Evaluation   Type of Evaluation Swallow Evaluation   Oral Motor   Oral Musculature anomalies present   Dentition (Oral Motor)   Dentition (Oral Motor) natural dentition   Facial Symmetry (Oral Motor)   Left Side Facial Asymmetry moderate impairment   Lip Function (Oral Motor)   Protrusion, Lip Range of Motion left side;moderate impairment   Retraction, Lip Range of Motion left side;moderate impairment   Lip Strength (Oral Motor) left side;moderate impairment   Lip Coordination (Oral Motor) left side;minimal impairment   Lip Sensitivity (Oral Motor) left upper lip decreased   Tongue Function (Oral Motor)   Tongue ROM (Oral Motor) protrusion is impaired;lateralization is impaired   Vocal Quality/Secretion Management (Oral Motor)   Vocal Quality (Oral Motor) WNL   Secretion Management (Oral Motor) WNL   General Swallowing Observations " Physician Discharge Summary     Patient ID:  Nisa Bran  [id number]  5 y.o.  1985    Admit date: 6/8/2017    Discharge date and time: 6/10/2017    Admission Diagnoses: Acute pancreatitis    Discharge Diagnoses:    Principal Diagnosis   Acute pancreatitis                                             Other Diagnoses  Principal Problem:    Acute pancreatitis (6/8/2017)    Active Problems:    Alcohol abuse (6/8/2017)      Tobacco abuse (6/8/2017)      Acute cystitis (6/8/2017)         Hospital Course:     Acute pancreatitis. Likely related to relapse in EtOH abuse. Pain improved with usual therapy. RUQ US negative. Encourage low fat diet, alcohol cessation. Continue anti-emetics and pain control PO as needed. . She has a plan in place to remain abstinent from EtOH.     Active Problems:  Alcohol abuse. Continue thiamine/folic acid/MVI. Will be returning to AA     Tobacco abuse. While smoking cessation is essential, alcohol cessation remains her best course of action to avoid any immediate threat to life.     Acute cystitis. Levofloxacin    PCP: Whitney Turner MD    Consults: None    Significant Diagnostic Studies: See Hospital Course    Discharged home in improved condition.     Discharge Exam:    Visit Vitals    /80 (BP 1 Location: Right arm, BP Patient Position: At rest)    Pulse 86    Temp 98 °F (36.7 °C)    Resp 20    Ht 5' 6\" (1.676 m)    Wt 61.2 kg (135 lb)    SpO2 98%    Breastfeeding No    BMI 21.79 kg/m2     Physical Exam:    Gen: Well-developed, well-nourished, in no acute distress  HEENT:  Pink conjunctivae, hearing intact to voice, moist mucous membranes  Neck: Supple  Resp: No accessory muscle use, clear breath sounds without wheezes rales or rhonchi  Card: No murmurs, normal S1, S2 without thrills or peripheral edema  Abd:  Soft, non-tender, non-distended, normoactive bowel sounds are present  Musc: No cyanosis or clubbing  Skin: No rashes or ulcers, skin turgor is good  Neuro:   Current Diet/Method of Nutritional Intake (General Swallowing Observations, NIS) NPO   Respiratory Support (General Swallowing Observations) none   Swallowing Evaluation Clinical swallow evaluation   Clinical Swallow Evaluation   Feeding Assistance no assistance needed   Clinical Swallow Evaluation Textures Trialed thin liquids;pureed;solid foods   Clinical Swallow Eval: Thin Liquid Texture Trial   Mode of Presentation, Thin Liquids cup;straw;self-fed   Oral Phase of Swallow WFL   Pharyngeal Phase of Swallow intact   Diagnostic Statement Slight delay/hesitation on all swallows; no additional oral dysphagia or s/sx of aspiration   Clinical Swallow Evaluation: Puree Solid Texture Trial   Mode of Presentation, Puree spoon;self-fed   Oral Phase, Puree WFL   Pharyngeal Phase, Puree intact   Diagnostic Statement WFL   Clinical Swallow Evaluation: Solid Food Texture Trial   Mode of Presentation self-fed   Oral Phase WFL   Pharyngeal Phase intact   Diagnostic Statement Instructed pt to chew on stronger/right side; pt followed with functional mastication and timing; mild residue throughout that cleared with liquid wash; no overt s/sx of aspiration   Esophageal Phase of Swallow   Patient reports or presents with symptoms of esophageal dysphagia No   Swallowing Recommendations   Diet Consistency Recommendations regular diet;thin liquids (level 0)   Supervision Level for Intake patient independent   Mode of Delivery Recommendations bolus size, small;place food on right side of mouth;slow rate of intake   Swallowing Maneuver Recommendations alternate food and liquid intake   Monitoring/Assistance Required (Eating/Swallowing) cue for finger/lingual sweep if oral pocketing present;check mouth frequently for oral residue/pocketing   Medication Administration Recommendations, Swallowing (SLP) whole as tolerated   Comment, Swallowing Recommendations Pt presents with minimal dysphagia on clinical swallow evaluation. Plan 1x follow  Cranial nerves are grossly intact,  strength is 5/5 bilaterally, hip flexion is 5/5 bilaterally, follows commands appropriately  Psych:  Good insight, oriented to person, place and time, alert    Disposition: home    Patient Instructions:   Current Discharge Medication List      START taking these medications    Details   folic acid (FOLVITE) 1 mg tablet Take 1 Tab by mouth daily. Qty: 30 Tab, Refills: 0      levoFLOXacin (LEVAQUIN) 750 mg tablet Take 1 Tab by mouth every twenty-four (24) hours. Qty: 4 Tab, Refills: 0      thiamine (B-1) 100 mg tablet Take 1 Tab by mouth daily. Qty: 30 Tab, Refills: 0      ondansetron (ZOFRAN ODT) 4 mg disintegrating tablet Take 1 Tab by mouth every eight (8) hours as needed for Nausea. Qty: 20 Tab, Refills: 0      traMADol (ULTRAM) 50 mg tablet Take 1 Tab by mouth every six (6) hours as needed for Pain. Max Daily Amount: 200 mg. Qty: 20 Tab, Refills: 0         CONTINUE these medications which have CHANGED    Details   QUEtiapine (SEROQUEL) 50 mg tablet Take 1 Tab by mouth three (3) times daily. Qty: 30 Tab, Refills: 0         CONTINUE these medications which have NOT CHANGED    Details   albuterol (VENTOLIN HFA) 90 mcg/actuation inhaler Take 2 Puffs by inhalation every four (4) hours as needed for Wheezing. traZODone (DESYREL) 150 mg tablet Take 150 mg by mouth nightly. multivitamin (ONE A DAY) tablet Take 1 Tab by mouth daily.            Activity: Activity as tolerated  Diet: Low fat, Low cholesterol  Wound Care: None needed    Follow-up with PCP in 2 weeks    Signed:  Indra Galvan DO  6/10/2017  8:42 AM    Greater than 30 mins was spent in coordination, counseling and execution of this patient's discharge up at a meal to further train swallow strategies and assess swallow function over the course of a meal.    General Therapy Interventions   Planned Therapy Interventions Dysphagia Treatment   Dysphagia treatment Instruction of safe swallow strategies   Clinical Impression   Criteria for Skilled Therapeutic Interventions Met (SLP Neha) Yes, treatment indicated   SLP Diagnosis Minimal dysphagia   Risks & Benefits of therapy have been explained evaluation/treatment results reviewed;care plan/treatment goals reviewed;risks/benefits reviewed;current/potential barriers reviewed;participants voiced agreement with care plan;participants included;patient   SLP Discharge Planning   SLP Discharge Recommendation home with outpatient speech therapy   SLP Rationale for DC Rec Pt will likely meet swallow goals during hospitalization; plan speech/language evaluation    SLP Brief overview of current status  Okay for regular diet and thin liquids with careful chewing and using tongue/finger to sweep out left cheek    Total Evaluation Time   Total Evaluation Time (Minutes) 20   SLP Goals   Therapy Frequency (SLP Eval) 2 times/wk   SLP Predicated Duration/Target Date for Goal Attainment 02/24/22   SLP Goals Swallow   SLP: Safely tolerate diet without signs/symptoms of aspiration Regular diet;Thin liquids;With use of swallow precautions;Independently

## 2022-03-19 PROBLEM — F10.221 ACUTE ALCOHOL INTOXICATION DELIRIUM WITH MODERATE OR SEVERE USE DISORDER (HCC): Status: ACTIVE | Noted: 2017-08-04

## 2022-03-19 PROBLEM — F10.10 ALCOHOL ABUSE: Status: ACTIVE | Noted: 2017-06-08

## 2022-03-19 PROBLEM — N30.00 ACUTE CYSTITIS: Status: ACTIVE | Noted: 2017-06-08

## 2022-03-20 PROBLEM — Z72.0 TOBACCO ABUSE: Status: ACTIVE | Noted: 2017-06-08

## 2022-03-20 PROBLEM — F10.221 ALCOHOL INTOXICATION DELIRIUM WITH MODERATE OR SEVERE USE DISORDER (HCC): Status: ACTIVE | Noted: 2017-08-06

## 2022-03-20 PROBLEM — K21.9 GERD (GASTROESOPHAGEAL REFLUX DISEASE): Status: ACTIVE | Noted: 2017-08-04

## 2022-03-20 PROBLEM — K85.90 ACUTE PANCREATITIS: Status: ACTIVE | Noted: 2017-06-08

## 2023-05-14 RX ORDER — HYDROCODONE BITARTRATE AND ACETAMINOPHEN 7.5; 3 MG/1; MG/1
1 TABLET ORAL 4 TIMES DAILY PRN
COMMUNITY

## 2023-05-14 RX ORDER — ONDANSETRON 4 MG/1
4 TABLET, ORALLY DISINTEGRATING ORAL EVERY 8 HOURS PRN
COMMUNITY
Start: 2017-06-10

## 2023-05-14 RX ORDER — SUCRALFATE 1 G/1
1 TABLET ORAL 4 TIMES DAILY
COMMUNITY

## 2023-05-14 RX ORDER — GABAPENTIN 300 MG/1
300 CAPSULE ORAL DAILY
COMMUNITY

## 2023-05-14 RX ORDER — TRAZODONE HYDROCHLORIDE 50 MG/1
TABLET ORAL
COMMUNITY

## 2023-05-14 RX ORDER — METOCLOPRAMIDE 10 MG/1
10 TABLET ORAL
COMMUNITY

## 2023-05-14 RX ORDER — FLUOXETINE HYDROCHLORIDE 40 MG/1
40 CAPSULE ORAL DAILY
COMMUNITY

## 2023-05-14 RX ORDER — PANCRELIPASE 36000; 180000; 114000 [USP'U]/1; [USP'U]/1; [USP'U]/1
CAPSULE, DELAYED RELEASE PELLETS ORAL
COMMUNITY

## 2023-05-14 RX ORDER — PANTOPRAZOLE SODIUM 40 MG/1
40 TABLET, DELAYED RELEASE ORAL DAILY
COMMUNITY

## 2023-05-14 RX ORDER — EPINEPHRINE 0.3 MG/.3ML
0.3 INJECTION SUBCUTANEOUS
COMMUNITY

## 2023-05-14 RX ORDER — PRAZOSIN HYDROCHLORIDE 1 MG/1
1 CAPSULE ORAL NIGHTLY
COMMUNITY

## 2023-05-14 RX ORDER — ALBUTEROL SULFATE 90 UG/1
2 AEROSOL, METERED RESPIRATORY (INHALATION) EVERY 4 HOURS PRN
COMMUNITY

## 2023-05-14 RX ORDER — DIFLUNISAL 500 MG/1
500 TABLET, FILM COATED ORAL 2 TIMES DAILY
COMMUNITY
Start: 2021-02-12

## 2023-05-14 RX ORDER — PROCHLORPERAZINE MALEATE 10 MG
5 TABLET ORAL EVERY 6 HOURS PRN
COMMUNITY

## (undated) DEVICE — LINE SAMP LNG AD ORAL NSL PT O2 TBNG SMRT CAPNOLN H +

## (undated) DEVICE — THE ENDO CARRY-ON PROCEDURE KIT CONTAINS ALL OF THE SUPPLIES AND INFECTION PREVENTION PRODUCTS NEEDED FOR ENDOSCOPIC PROCEDURES: Brand: ENDO CARRY-ON PROCEDURE KIT